# Patient Record
Sex: FEMALE | Race: WHITE | Employment: FULL TIME | URBAN - METROPOLITAN AREA
[De-identification: names, ages, dates, MRNs, and addresses within clinical notes are randomized per-mention and may not be internally consistent; named-entity substitution may affect disease eponyms.]

---

## 2017-10-19 ENCOUNTER — HOSPITAL ENCOUNTER (EMERGENCY)
Age: 57
Discharge: HOME | End: 2017-10-19
Attending: EMERGENCY MEDICINE
Payer: COMMERCIAL

## 2017-10-19 VITALS
OXYGEN SATURATION: 98 % | TEMPERATURE: 98.1 F | WEIGHT: 140 LBS | BODY MASS INDEX: 23.32 KG/M2 | RESPIRATION RATE: 20 BRPM | SYSTOLIC BLOOD PRESSURE: 146 MMHG | HEIGHT: 65 IN | HEART RATE: 72 BPM | DIASTOLIC BLOOD PRESSURE: 89 MMHG

## 2017-10-19 DIAGNOSIS — I10 ESSENTIAL HYPERTENSION: Primary | ICD-10-CM

## 2017-10-19 LAB
ANION GAP SERPL CALCULATED.3IONS-SCNC: 17 MEQ/L (ref 7–13)
BASOPHILS ABSOLUTE: 0 K/UL (ref 0–0.2)
BASOPHILS RELATIVE PERCENT: 0.6 %
BUN BLDV-MCNC: 11 MG/DL (ref 6–20)
CALCIUM SERPL-MCNC: 9.8 MG/DL (ref 8.6–10.2)
CHLORIDE BLD-SCNC: 101 MEQ/L (ref 98–107)
CO2: 25 MEQ/L (ref 22–29)
CREAT SERPL-MCNC: 0.46 MG/DL (ref 0.5–0.9)
EKG ATRIAL RATE: 63 BPM
EKG P AXIS: 72 DEGREES
EKG P-R INTERVAL: 112 MS
EKG Q-T INTERVAL: 416 MS
EKG QRS DURATION: 86 MS
EKG QTC CALCULATION (BAZETT): 425 MS
EKG R AXIS: 29 DEGREES
EKG T AXIS: 34 DEGREES
EKG VENTRICULAR RATE: 63 BPM
EOSINOPHILS ABSOLUTE: 0.2 K/UL (ref 0–0.7)
EOSINOPHILS RELATIVE PERCENT: 5.8 %
GFR AFRICAN AMERICAN: >60
GFR NON-AFRICAN AMERICAN: >60
GLUCOSE BLD-MCNC: 102 MG/DL (ref 74–109)
HCT VFR BLD CALC: 49.4 % (ref 37–47)
HEMOGLOBIN: 17.2 G/DL (ref 12–16)
LYMPHOCYTES ABSOLUTE: 1.4 K/UL (ref 1–4.8)
LYMPHOCYTES RELATIVE PERCENT: 36.6 %
MCH RBC QN AUTO: 33.5 PG (ref 27–31.3)
MCHC RBC AUTO-ENTMCNC: 34.9 % (ref 33–37)
MCV RBC AUTO: 95.9 FL (ref 82–100)
MONOCYTES ABSOLUTE: 0.3 K/UL (ref 0.2–0.8)
MONOCYTES RELATIVE PERCENT: 7.6 %
NEUTROPHILS ABSOLUTE: 1.8 K/UL (ref 1.4–6.5)
NEUTROPHILS RELATIVE PERCENT: 49.4 %
PDW BLD-RTO: 12.9 % (ref 11.5–14.5)
PLATELET # BLD: 320 K/UL (ref 130–400)
POTASSIUM SERPL-SCNC: 4.3 MEQ/L (ref 3.5–5.1)
RBC # BLD: 5.15 M/UL (ref 4.2–5.4)
SODIUM BLD-SCNC: 143 MEQ/L (ref 132–144)
TROPONIN: <0.01 NG/ML (ref 0–0.01)
WBC # BLD: 3.7 K/UL (ref 4.8–10.8)

## 2017-10-19 PROCEDURE — 6370000000 HC RX 637 (ALT 250 FOR IP): Performed by: EMERGENCY MEDICINE

## 2017-10-19 PROCEDURE — 93005 ELECTROCARDIOGRAM TRACING: CPT

## 2017-10-19 PROCEDURE — 84484 ASSAY OF TROPONIN QUANT: CPT

## 2017-10-19 PROCEDURE — 85025 COMPLETE CBC W/AUTO DIFF WBC: CPT

## 2017-10-19 PROCEDURE — 99284 EMERGENCY DEPT VISIT MOD MDM: CPT

## 2017-10-19 PROCEDURE — 80048 BASIC METABOLIC PNL TOTAL CA: CPT

## 2017-10-19 PROCEDURE — 36415 COLL VENOUS BLD VENIPUNCTURE: CPT

## 2017-10-19 RX ORDER — LISINOPRIL 20 MG/1
20 TABLET ORAL DAILY
Qty: 30 TABLET | Refills: 0 | Status: SHIPPED | OUTPATIENT
Start: 2017-10-19 | End: 2017-11-13 | Stop reason: SDUPTHER

## 2017-10-19 RX ORDER — SODIUM CHLORIDE 0.9 % (FLUSH) 0.9 %
3 SYRINGE (ML) INJECTION EVERY 8 HOURS
Status: DISCONTINUED | OUTPATIENT
Start: 2017-10-19 | End: 2017-10-19 | Stop reason: HOSPADM

## 2017-10-19 RX ORDER — CLONIDINE HYDROCHLORIDE 0.1 MG/1
0.2 TABLET ORAL ONCE
Status: COMPLETED | OUTPATIENT
Start: 2017-10-19 | End: 2017-10-19

## 2017-10-19 RX ADMIN — CLONIDINE HYDROCHLORIDE 0.2 MG: 0.1 TABLET ORAL at 08:24

## 2017-10-19 ASSESSMENT — ENCOUNTER SYMPTOMS
NAUSEA: 0
WHEEZING: 0
CHEST TIGHTNESS: 0
SHORTNESS OF BREATH: 0
BACK PAIN: 0
BLOOD IN STOOL: 0
SINUS PRESSURE: 0
GASTROINTESTINAL NEGATIVE: 1
COUGH: 0
RESPIRATORY NEGATIVE: 1
SINUS PAIN: 0
ABDOMINAL DISTENTION: 0
DIARRHEA: 0
EYES NEGATIVE: 1
ABDOMINAL PAIN: 0
VOMITING: 0
SORE THROAT: 0
RHINORRHEA: 0
EYE PAIN: 0
EYE DISCHARGE: 0

## 2017-10-19 ASSESSMENT — PAIN DESCRIPTION - PAIN TYPE: TYPE: ACUTE PAIN

## 2017-10-19 ASSESSMENT — PAIN DESCRIPTION - DESCRIPTORS: DESCRIPTORS: ACHING

## 2017-10-19 ASSESSMENT — PAIN SCALES - GENERAL: PAINLEVEL_OUTOF10: 2

## 2017-10-19 ASSESSMENT — PAIN DESCRIPTION - FREQUENCY: FREQUENCY: CONTINUOUS

## 2017-10-19 ASSESSMENT — PAIN DESCRIPTION - LOCATION: LOCATION: HEAD

## 2017-10-19 NOTE — ED PROVIDER NOTES
to 7 for level 4, 8 or more for level 5)     ED Triage Vitals [10/19/17 0801]   BP Temp Temp Source Pulse Resp SpO2 Height Weight   (!) 202/106 98.1 °F (36.7 °C) Oral 85 -- 97 % 5' 5\" (1.651 m) 140 lb (63.5 kg)       Physical Exam   Constitutional: She is oriented to person, place, and time. She appears well-developed and well-nourished. HENT:   Head: Normocephalic and atraumatic. Right Ear: External ear normal.   Eyes: Conjunctivae and EOM are normal. Pupils are equal, round, and reactive to light. Right eye exhibits no discharge. Left eye exhibits no discharge. No scleral icterus. Neck: Normal range of motion. Neck supple. No JVD present. No tracheal deviation present. Cardiovascular: Normal rate, regular rhythm, normal heart sounds and intact distal pulses. Exam reveals no friction rub. No murmur heard. Pulmonary/Chest: Effort normal and breath sounds normal. No stridor. No respiratory distress. She has no wheezes. She has no rales. She exhibits no tenderness. Abdominal: Soft. Bowel sounds are normal. She exhibits no distension. There is no tenderness. Musculoskeletal: Normal range of motion. She exhibits no edema, tenderness or deformity. Lymphadenopathy:     She has no cervical adenopathy. Neurological: She is alert and oriented to person, place, and time. Skin: Skin is warm and dry. No rash noted. No erythema. No pallor. Psychiatric: She has a normal mood and affect. Her behavior is normal. Judgment and thought content normal.   Nursing note and vitals reviewed. left external auditory canal slightly reddened but no tragus pain or external auricle pain with movement    DIAGNOSTIC RESULTS     EKG: All EKG's are interpreted by the Emergency Department Physician who either signs or Co-signs this chart in the absence of a cardiologist.    EKG interpreted by myself demonstrates a normal sinus rhythm with a ventricular rate is 63 MA interval of 112 and a QRS duration of 86.   The QT corrected is 425. This is a normal sinus rhythm and normal EKG. RADIOLOGY:   Non-plain film images such as CT, Ultrasound and MRI are read by the radiologist. Plain radiographic images are visualized and preliminarily interpreted by the emergency physician with the below findings:    None    Interpretation per the Radiologist below, if available at the time of this note:    No orders to display         ED BEDSIDE ULTRASOUND:   Performed by ED Physician - none    LABS:  Labs Reviewed   BASIC METABOLIC PANEL - Abnormal; Notable for the following:        Result Value    Anion Gap 17 (*)     CREATININE 0.46 (*)     All other components within normal limits   CBC WITH AUTO DIFFERENTIAL - Abnormal; Notable for the following:     WBC 3.7 (*)     Hemoglobin 17.2 (*)     Hematocrit 49.4 (*)     MCH 33.5 (*)     All other components within normal limits   TROPONIN       All other labs were within normal range or not returned as of this dictation. EMERGENCY DEPARTMENT COURSE and DIFFERENTIAL DIAGNOSIS/MDM:   Vitals:    Vitals:    10/19/17 0801 10/19/17 0854 10/19/17 0927   BP: (!) 202/106 (!) 203/116 (!) 146/89   Pulse: 85 72    Resp:   20   Temp: 98.1 °F (36.7 °C)     TempSrc: Oral     SpO2: 97% 97% 98%   Weight: 140 lb (63.5 kg)     Height: 5' 5\" (1.651 m)         Patient's EKG is normal the patient's laboratory did not show any renal insufficiency L be placing her on lisinopril. And she was given a follow-up with Dr. Dilcia Lozada who was her choice. My impression is acute essential hypertension untreated    MDM    CRITICAL CARE TIME   Total Critical Care time was 0 minutes, excluding separately reportable procedures. There was a high probability of clinically significant/life threatening deterioration in the patient's condition which required my urgent intervention. CONSULTS:  None    PROCEDURES:  Unless otherwise noted below, none     Procedures    FINAL IMPRESSION      1.  Essential hypertension

## 2017-10-23 ENCOUNTER — APPOINTMENT (OUTPATIENT)
Dept: GENERAL RADIOLOGY | Age: 57
End: 2017-10-23
Payer: COMMERCIAL

## 2017-10-23 ENCOUNTER — HOSPITAL ENCOUNTER (EMERGENCY)
Age: 57
Discharge: HOME | End: 2017-10-23
Attending: EMERGENCY MEDICINE
Payer: COMMERCIAL

## 2017-10-23 VITALS
DIASTOLIC BLOOD PRESSURE: 78 MMHG | SYSTOLIC BLOOD PRESSURE: 144 MMHG | TEMPERATURE: 98.4 F | HEIGHT: 65 IN | BODY MASS INDEX: 23.32 KG/M2 | RESPIRATION RATE: 18 BRPM | HEART RATE: 75 BPM | WEIGHT: 140 LBS | OXYGEN SATURATION: 99 %

## 2017-10-23 DIAGNOSIS — S16.1XXA ACUTE STRAIN OF NECK MUSCLE, INITIAL ENCOUNTER: ICD-10-CM

## 2017-10-23 DIAGNOSIS — M54.2 NECK PAIN: Primary | ICD-10-CM

## 2017-10-23 DIAGNOSIS — R51.9 NONINTRACTABLE HEADACHE, UNSPECIFIED CHRONICITY PATTERN, UNSPECIFIED HEADACHE TYPE: ICD-10-CM

## 2017-10-23 PROCEDURE — 99283 EMERGENCY DEPT VISIT LOW MDM: CPT

## 2017-10-23 PROCEDURE — 6370000000 HC RX 637 (ALT 250 FOR IP): Performed by: EMERGENCY MEDICINE

## 2017-10-23 PROCEDURE — 72040 X-RAY EXAM NECK SPINE 2-3 VW: CPT

## 2017-10-23 PROCEDURE — 96372 THER/PROPH/DIAG INJ SC/IM: CPT

## 2017-10-23 PROCEDURE — 6360000002 HC RX W HCPCS: Performed by: EMERGENCY MEDICINE

## 2017-10-23 RX ORDER — ORPHENADRINE CITRATE 30 MG/ML
60 INJECTION INTRAMUSCULAR; INTRAVENOUS ONCE
Status: DISCONTINUED | OUTPATIENT
Start: 2017-10-23 | End: 2017-10-23 | Stop reason: HOSPADM

## 2017-10-23 RX ORDER — KETOROLAC TROMETHAMINE 30 MG/ML
60 INJECTION, SOLUTION INTRAMUSCULAR; INTRAVENOUS ONCE
Status: COMPLETED | OUTPATIENT
Start: 2017-10-23 | End: 2017-10-23

## 2017-10-23 RX ORDER — CYCLOBENZAPRINE HCL 10 MG
10 TABLET ORAL ONCE
Status: COMPLETED | OUTPATIENT
Start: 2017-10-23 | End: 2017-10-23

## 2017-10-23 RX ORDER — KETOROLAC TROMETHAMINE 10 MG/1
10 TABLET, FILM COATED ORAL EVERY 6 HOURS PRN
Qty: 20 TABLET | Refills: 0 | Status: SHIPPED | OUTPATIENT
Start: 2017-10-23 | End: 2017-11-13 | Stop reason: ALTCHOICE

## 2017-10-23 RX ORDER — ORPHENADRINE CITRATE 100 MG/1
100 TABLET, EXTENDED RELEASE ORAL 2 TIMES DAILY
Qty: 20 TABLET | Refills: 0 | Status: SHIPPED | OUTPATIENT
Start: 2017-10-23 | End: 2017-11-02

## 2017-10-23 RX ADMIN — KETOROLAC TROMETHAMINE 60 MG: 30 INJECTION, SOLUTION INTRAMUSCULAR at 02:55

## 2017-10-23 RX ADMIN — CYCLOBENZAPRINE HYDROCHLORIDE 10 MG: 10 TABLET, FILM COATED ORAL at 03:05

## 2017-10-23 ASSESSMENT — ENCOUNTER SYMPTOMS
NAUSEA: 0
APNEA: 0
COUGH: 0
BACK PAIN: 0
CONSTIPATION: 0
RHINORRHEA: 0
ABDOMINAL PAIN: 0
SHORTNESS OF BREATH: 0
SORE THROAT: 0
ABDOMINAL DISTENTION: 0
VOMITING: 0
WHEEZING: 0
COLOR CHANGE: 0
PHOTOPHOBIA: 0
EYE PAIN: 0
DIARRHEA: 0
SINUS PRESSURE: 0

## 2017-10-23 ASSESSMENT — PAIN SCALES - GENERAL
PAINLEVEL_OUTOF10: 2
PAINLEVEL_OUTOF10: 0

## 2017-10-23 NOTE — ED PROVIDER NOTES
Head: Normocephalic and atraumatic. Nose: Nose normal.   Mouth/Throat: Oropharynx is clear and moist. No oropharyngeal exudate. Eyes: Conjunctivae and EOM are normal. Pupils are equal, round, and reactive to light. Right eye exhibits no discharge. Left eye exhibits no discharge. No scleral icterus. Neck: Normal range of motion. Neck supple. No JVD present. No tracheal deviation present. No thyromegaly present. Cardiovascular: Normal rate, regular rhythm, normal heart sounds and intact distal pulses. Exam reveals no gallop and no friction rub. No murmur heard. Pulmonary/Chest: Effort normal and breath sounds normal. No stridor. No respiratory distress. She has no wheezes. She has no rales. She exhibits no tenderness. Abdominal: Soft. Bowel sounds are normal. She exhibits no distension and no mass. There is no tenderness. There is no rebound and no guarding. Musculoskeletal: Normal range of motion. She exhibits no edema, tenderness or deformity. Lymphadenopathy:     She has no cervical adenopathy. Neurological: She is alert and oriented to person, place, and time. She has normal reflexes. No cranial nerve deficit. She exhibits normal muscle tone. Coordination normal.   Skin: Skin is warm and dry. No rash noted. She is not diaphoretic. No erythema. No pallor. Psychiatric: She has a normal mood and affect. Her behavior is normal. Judgment and thought content normal.   Nursing note and vitals reviewed.       DIAGNOSTIC RESULTS     EKG: All EKG's are interpreted by the Emergency Department Physician who either signs or Co-signs this chart in the absence of a cardiologist.        RADIOLOGY:   Non-plain film images such as CT, Ultrasound and MRI are read by the radiologist. Plain radiographic images are visualized and preliminarily interpreted by the emergency physician with the below findings:        Interpretation per the Radiologist below, if available at the time of this note:    XR CERVICAL SPINE (2-3 VIEWS)    (Results Pending)         ED BEDSIDE ULTRASOUND:   Performed by ED Physician - none    LABS:  Labs Reviewed - No data to display    All other labs were within normal range or not returned as of this dictation. EMERGENCY DEPARTMENT COURSE and DIFFERENTIAL DIAGNOSIS/MDM:   Vitals:    Vitals:    10/23/17 0213   BP: (!) 176/90   Pulse: 79   Resp: 18   Temp: 98.4 °F (36.9 °C)   SpO2: 99%   Weight: 140 lb (63.5 kg)   Height: 5' 5\" (1.651 m)           MDM  Number of Diagnoses or Management Options     Amount and/or Complexity of Data Reviewed  Tests in the radiology section of CPT®: reviewed and ordered    Risk of Complications, Morbidity, and/or Mortality  Presenting problems: moderate  Diagnostic procedures: moderate  Management options: moderate    Patient Progress  Patient progress: improved      CRITICAL CARE TIME   Total Critical Care time was jacy, excluding separately reportable procedures. There was a high probability of clinically significant/life threatening deterioration in the patient's condition which required my urgent intervention. CONSULTS:  None    PROCEDURES:  Unless otherwise noted below, none     Procedures    FINAL IMPRESSION      1. Neck pain    2. Acute strain of neck muscle, initial encounter    3.  Nonintractable headache, unspecified chronicity pattern, unspecified headache type          DISPOSITION/PLAN   DISPOSITION Decision to Discharge    PATIENT REFERRED TO:  [facility]  [address] [place] [address] [phone]          DISCHARGE MEDICATIONS:  New Prescriptions    KETOROLAC (TORADOL) 10 MG TABLET    Take 1 tablet by mouth every 6 hours as needed for Pain    ORPHENADRINE (NORFLEX) 100 MG EXTENDED RELEASE TABLET    Take 1 tablet by mouth 2 times daily for 10 days          (Please note that portions of this note were completed with a voice recognition program.  Efforts were made to edit the dictations but occasionally words are

## 2017-11-13 ENCOUNTER — OFFICE VISIT (OUTPATIENT)
Dept: FAMILY MEDICINE CLINIC | Age: 57
End: 2017-11-13

## 2017-11-13 VITALS
RESPIRATION RATE: 16 BRPM | SYSTOLIC BLOOD PRESSURE: 136 MMHG | HEART RATE: 76 BPM | WEIGHT: 139 LBS | TEMPERATURE: 97.4 F | HEIGHT: 65 IN | DIASTOLIC BLOOD PRESSURE: 84 MMHG | BODY MASS INDEX: 23.16 KG/M2

## 2017-11-13 DIAGNOSIS — Z76.89 ENCOUNTER TO ESTABLISH CARE: ICD-10-CM

## 2017-11-13 DIAGNOSIS — Z12.39 BREAST CANCER SCREENING: ICD-10-CM

## 2017-11-13 DIAGNOSIS — Z71.6 TOBACCO ABUSE COUNSELING: ICD-10-CM

## 2017-11-13 DIAGNOSIS — Z72.0 TOBACCO ABUSE: ICD-10-CM

## 2017-11-13 DIAGNOSIS — Z00.00 HEALTHCARE MAINTENANCE: ICD-10-CM

## 2017-11-13 DIAGNOSIS — I10 HYPERTENSION, UNSPECIFIED TYPE: Primary | ICD-10-CM

## 2017-11-13 LAB — HBA1C MFR BLD: 4.9 %

## 2017-11-13 PROCEDURE — 3017F COLORECTAL CA SCREEN DOC REV: CPT | Performed by: INTERNAL MEDICINE

## 2017-11-13 PROCEDURE — G8420 CALC BMI NORM PARAMETERS: HCPCS | Performed by: INTERNAL MEDICINE

## 2017-11-13 PROCEDURE — 83036 HEMOGLOBIN GLYCOSYLATED A1C: CPT | Performed by: INTERNAL MEDICINE

## 2017-11-13 PROCEDURE — G8427 DOCREV CUR MEDS BY ELIG CLIN: HCPCS | Performed by: INTERNAL MEDICINE

## 2017-11-13 PROCEDURE — 99204 OFFICE O/P NEW MOD 45 MIN: CPT | Performed by: INTERNAL MEDICINE

## 2017-11-13 PROCEDURE — 3014F SCREEN MAMMO DOC REV: CPT | Performed by: INTERNAL MEDICINE

## 2017-11-13 PROCEDURE — G8484 FLU IMMUNIZE NO ADMIN: HCPCS | Performed by: INTERNAL MEDICINE

## 2017-11-13 PROCEDURE — 4004F PT TOBACCO SCREEN RCVD TLK: CPT | Performed by: INTERNAL MEDICINE

## 2017-11-13 RX ORDER — LISINOPRIL 20 MG/1
20 TABLET ORAL DAILY
Qty: 30 TABLET | Refills: 0 | Status: SHIPPED | OUTPATIENT
Start: 2017-11-13

## 2017-11-13 ASSESSMENT — PATIENT HEALTH QUESTIONNAIRE - PHQ9
SUM OF ALL RESPONSES TO PHQ QUESTIONS 1-9: 0
SUM OF ALL RESPONSES TO PHQ9 QUESTIONS 1 & 2: 0
2. FEELING DOWN, DEPRESSED OR HOPELESS: 0
1. LITTLE INTEREST OR PLEASURE IN DOING THINGS: 0

## 2017-11-13 ASSESSMENT — ENCOUNTER SYMPTOMS
NAUSEA: 0
SHORTNESS OF BREATH: 0
EYE ITCHING: 0
PHOTOPHOBIA: 0
WHEEZING: 0
EYE REDNESS: 0
BACK PAIN: 0
CONSTIPATION: 0
EYE DISCHARGE: 0
EYE PAIN: 0
SORE THROAT: 0
VOICE CHANGE: 0
ABDOMINAL PAIN: 0
BLOOD IN STOOL: 0
TROUBLE SWALLOWING: 0
COLOR CHANGE: 0
COUGH: 0
ABDOMINAL DISTENTION: 0
DIARRHEA: 0

## 2017-11-13 NOTE — PROGRESS NOTES
Subjective:      Patient ID: Carole Sanchez is a 62 y.o. female    HPI    Presents to establish care with new PCP. Left old PCP for personal reasons. PMHx of headaches. Seen in [facility] ER on 10/19/17 and 10/23/17 for headaches and neck strain. Found with BP elevated 202/106  and 176/90 currently on lisinopril. Claims her BP readings at home are normally not elevated. Denies regular exercise, but is physically active at [facility] and home. Has been taking Advil for neck pain, drinks coffee daily,    2 galsses of wine 5 times per week. Consumes salt-cooked food, denies consumption of canned/processed foods. Never had a mammogram or colonoscopy in the past, last pap was 8 years ago-normal.     Complains of ringing in the ears x 3 weeks, no dizziness, no hearing difficulty. Worse in the left ear than right. Already seen by ENT and intends to continue to follow with them. Past Medical History:   Diagnosis Date    Headache     Hypertension      History reviewed. No pertinent surgical history. Social History     Social History    Marital status: Single     Spouse name: N/A    Number of children: N/A    Years of education: N/A     Occupational History    Not on file. Social History Main Topics    Smoking status: Current Every Day Smoker     Packs/day: 0.25     Years: 30.00     Types: Cigarettes    Smokeless tobacco: Never Used    Alcohol use 1.2 oz/week     2 Glasses of wine per week    Drug use: No    Sexual activity: Yes     Partners: Male     Other Topics Concern    Not on file     Social History Narrative    No narrative on file     Family History   Problem Relation Age of Onset    High Blood Pressure Maternal Aunt     High Blood Pressure Maternal Uncle     Diabetes Maternal Uncle      Allergies:  Review of patient's allergies indicates no known allergies. There is no problem list on file for this patient. No current outpatient prescriptions on file prior to visit.      No current facility-administered medications on file prior to visit. Review of Systems   Constitutional: Negative for activity change, appetite change, chills, diaphoresis, fatigue, fever and unexpected weight change. HENT: Negative for congestion, dental problem, drooling, ear discharge, ear pain, hearing loss, mouth sores, sore throat, trouble swallowing and voice change. Eyes: Negative for photophobia, pain, discharge, redness and itching. Respiratory: Negative for cough, shortness of breath and wheezing. Gastrointestinal: Negative for abdominal distention, abdominal pain, blood in stool, constipation, diarrhea and nausea. Endocrine: Negative for cold intolerance, heat intolerance, polydipsia and polyuria. Genitourinary: Negative for decreased urine volume, difficulty urinating, dysuria, flank pain, frequency, hematuria and urgency. Musculoskeletal: Negative for arthralgias, back pain and joint swelling. Skin: Negative for color change. Allergic/Immunologic: Negative for environmental allergies and food allergies. Neurological: Negative for dizziness, seizures, syncope, weakness, light-headedness, numbness and headaches. Psychiatric/Behavioral: Negative for agitation, decreased concentration, dysphoric mood and hallucinations. The patient is not nervous/anxious. Objective:   /84   Pulse 76   Temp 97.4 °F (36.3 °C) (Temporal)   Resp 16   Ht 5' 5\" (1.651 m)   Wt 139 lb (63 kg)   LMP  (LMP Unknown)   BMI 23.13 kg/m²     Physical Exam   Constitutional: She is oriented to person, place, and time. She appears well-developed and well-nourished. No distress. HENT:   Head: Normocephalic and atraumatic. Right Ear: External ear normal.   Left Ear: External ear normal.   Nose: Nose normal.   Mouth/Throat: Oropharynx is clear and moist. No oropharyngeal exudate. Eyes: Conjunctivae and EOM are normal. Pupils are equal, round, and reactive to light.  Right eye exhibits no discharge. Left eye exhibits no discharge. No scleral icterus. Neck: No JVD present. No thyromegaly present. Cardiovascular: Normal rate, regular rhythm, S1 normal, S2 normal, normal heart sounds and intact distal pulses. Exam reveals no gallop and no friction rub. No murmur heard. Pulmonary/Chest: Effort normal and breath sounds normal. No stridor. No respiratory distress. She has no wheezes. She has no rales. She exhibits no tenderness. Abdominal: Soft. Bowel sounds are normal. She exhibits no distension and no mass. There is no tenderness. There is no rebound. Musculoskeletal: Normal range of motion. She exhibits no edema, tenderness or deformity. Lymphadenopathy:     She has no cervical adenopathy. Neurological: She is alert and oriented to person, place, and time. She has normal reflexes. No cranial nerve deficit. She exhibits normal muscle tone. Coordination normal.   Skin: No rash noted. She is not diaphoretic. No erythema. Psychiatric: She has a normal mood and affect. Her behavior is normal.   Vitals reviewed. Assessment:      1. Hypertension, unspecified type  lisinopril (PRINIVIL) 20 MG tablet   2. Breast cancer screening  MILIND DIGITAL SCREEN W CAD BILATERAL   3. Healthcare maintenance  MILIND DIGITAL SCREEN W CAD BILATERAL    Lipid Panel    CBC Auto Differential    Comprehensive Metabolic Panel    TSH Without Reflex    Ambulatory referral to Gastroenterology    POCT glycosylated hemoglobin (Hb A1C)   4. Tobacco abuse     5. Tobacco abuse counseling     6.  Encounter to establish care  MILIND DIGITAL SCREEN W CAD BILATERAL    Lipid Panel    CBC Auto Differential    Comprehensive Metabolic Panel    TSH Without Reflex    POCT glycosylated hemoglobin (Hb A1C)         Plan:      Orders Placed This Encounter   Procedures    MILIND DIGITAL SCREEN W CAD BILATERAL     Standing Status:   Future     Standing Expiration Date:   11/13/2018     Order Specific Question:   Reason for exam:     Answer:

## 2017-11-13 NOTE — PATIENT INSTRUCTIONS
Tips to Help You Stop Smoking       Cigarette smoking is a preventable cause of death in the [place]. If you have thought about quitting but haven't been able to, here are some reasons why you should and some ways to do it. Here's Why   Quitting smoking now can decrease your risk of getting smoking-related illnesses like:   Heart disease   Stroke   Several types of cancer, including:   Lung   Mouth   Esophagus   Larynx   Bladder   Pancreas   Kidney   Chronic lung diseases:   Bronchitis   Emphysema   Asthma   Cataracts   Macular degeneration   Thyroid conditions   Hearing loss   Erectile dysfunction   Dementia   Osteoporosis   Here's How   Once you've decided to quit smoking, set your target quit date a few weeks away. In the time leading up to your quit day, try some of these ideas offered by the [address] to help you successfully quit smoking. For the best results, work with your doctor. Together, you can test your lung function and compare the results to those of a nonsmoking person. The results can be given to you as your lung age. Finding out your lung age right after having the test done may help you to stop smoking. Your doctor can also discuss with you all of your options and refer you to smoking-cessation support groups. You may wish to use nicotine replacement (gum, patches, inhaler) or one of the prescription medications that have been shown to increase quit rates and prolong abstinence from smoking. But whatever you and your doctor decide on these matters, it will still be you who decides when an how to quit. Here are some techniques:   Switch Brands   Switch to a brand you find distasteful. Change to a brand that is low in tar and nicotine a couple of weeks before your target quit date. This will help change your smoking behavior.  However, do not smoke more cigarettes, inhale them more often or more deeply, or place your fingertips over for you. If you like to smoke with others, smoke alone. Turn your chair to an empty corner and focus only on the cigarette you are smoking and all its many negative effects. Collect all your cigarette butts in one large glass container as a visual reminder of the filth made by smoking. Just Before Quitting   Practice going without cigarettes. Don't think of never smoking again. Think of quitting in terms of one day at a time . Tell yourself you won't smoke today, and then don't. Clean your clothes to rid them of the cigarette smell, which can linger a long time. On the Day You Quit   Throw away all your cigarettes and matches. Hide your lighters and ashtrays. Visit the dentist and have your teeth cleaned to get rid of tobacco stains. Notice how nice they look and resolve to keep them that way. Make a list of things you'd like to buy for yourself or someone else. Estimate the cost in terms of packs of cigarettes, and put the money aside to buy these presents. Keep very busy on the big day. Go to the movies, exercise, take long walks, or go bike riding. Remind your family and friends that this is your quit date, and ask them to help you over the rough spots of the first couple of days and weeks. Buy yourself a treat or do something special to celebrate. Telephone and Internet Support   Telephone, web-, and computer-based programs can offer you the support that you need to quit and to stay smoke-free. You can find many programs online, like the American [employer] Association's [place] from Smoking . Immediately After Quitting   Develop a clean, fresh, nonsmoking environment around yourselfat work and at home. Buy yourself flowersyou may be surprised how much you can enjoy their scent now. The first few days after you quit, spend as much free time as possible in places where smoking isn't allowed, such as [address] [place] [address] Street, museums, theaters, department stores, and churches.    Drink large quantities of water and fruit juice (but avoid sodas that contain caffeine). Try to avoid alcohol, coffee, and other beverages that you associate with cigarette smoking. Strike up conversation instead of a match for a cigarette. If you miss the sensation of having a cigarette in your hand, play with something elsea pencil, a paper clip, a marble. If you miss having something in your mouth, try toothpicks or a fake cigarette.

## 2022-05-10 ENCOUNTER — APPOINTMENT (OUTPATIENT)
Dept: GENERAL RADIOLOGY | Age: 62
End: 2022-05-10
Payer: COMMERCIAL

## 2022-05-10 ENCOUNTER — HOSPITAL ENCOUNTER (EMERGENCY)
Age: 62
Discharge: HOME | End: 2022-05-10
Attending: EMERGENCY MEDICINE
Payer: COMMERCIAL

## 2022-05-10 VITALS
DIASTOLIC BLOOD PRESSURE: 84 MMHG | WEIGHT: 140 LBS | OXYGEN SATURATION: 97 % | TEMPERATURE: 96.9 F | SYSTOLIC BLOOD PRESSURE: 152 MMHG | BODY MASS INDEX: 23.32 KG/M2 | HEART RATE: 72 BPM | RESPIRATION RATE: 16 BRPM | HEIGHT: 65 IN

## 2022-05-10 DIAGNOSIS — S80.812A ABRASION OF LEFT LOWER EXTREMITY, INITIAL ENCOUNTER: Primary | ICD-10-CM

## 2022-05-10 DIAGNOSIS — S80.811A ABRASION OF RIGHT LOWER EXTREMITY, INITIAL ENCOUNTER: ICD-10-CM

## 2022-05-10 PROCEDURE — 90471 IMMUNIZATION ADMIN: CPT | Performed by: EMERGENCY MEDICINE

## 2022-05-10 PROCEDURE — 90715 TDAP VACCINE 7 YRS/> IM: CPT | Performed by: EMERGENCY MEDICINE

## 2022-05-10 PROCEDURE — 6360000002 HC RX W HCPCS: Performed by: EMERGENCY MEDICINE

## 2022-05-10 PROCEDURE — 73562 X-RAY EXAM OF KNEE 3: CPT

## 2022-05-10 PROCEDURE — 99284 EMERGENCY DEPT VISIT MOD MDM: CPT

## 2022-05-10 PROCEDURE — 73590 X-RAY EXAM OF LOWER LEG: CPT

## 2022-05-10 RX ADMIN — TETANUS TOXOID, REDUCED DIPHTHERIA TOXOID AND ACELLULAR PERTUSSIS VACCINE, ADSORBED 0.5 ML: 5; 2.5; 8; 8; 2.5 SUSPENSION INTRAMUSCULAR at 11:42

## 2022-05-10 ASSESSMENT — PAIN DESCRIPTION - LOCATION: LOCATION: LEG

## 2022-05-10 ASSESSMENT — PAIN DESCRIPTION - FREQUENCY: FREQUENCY: CONTINUOUS

## 2022-05-10 ASSESSMENT — PAIN SCALES - GENERAL: PAINLEVEL_OUTOF10: 4

## 2022-05-10 ASSESSMENT — PAIN DESCRIPTION - DESCRIPTORS: DESCRIPTORS: ACHING

## 2022-05-10 ASSESSMENT — PAIN DESCRIPTION - ORIENTATION: ORIENTATION: LOWER;RIGHT

## 2022-05-10 ASSESSMENT — PAIN DESCRIPTION - PAIN TYPE: TYPE: ACUTE PAIN

## 2022-05-10 NOTE — ED PROVIDER NOTES
[facility] ED  EMERGENCY DEPARTMENT ENCOUNTER      Pt Name: Roberto Montemayor  MRN: [id number]  [place] 1960  Date of evaluation: 5/10/2022  Provider: Ayala Pacheco MD    CHIEF COMPLAINT       Chief Complaint   Patient presents with    Leg Injury     Pt has abrassions to both legs, pt fell through a cement slab covering a septic system         HISTORY OF PRESENT ILLNESS   (Location/Symptom, Timing/Onset, Context/Setting, Quality, Duration, Modifying Factors, Severity)  Note limiting factors. 66-year-old female presenting with right leg pain. Patient tripped and fell onto a septic tank onto her right leg. She did not hit her head or lose consciousness. She is not on blood thinners. Has not taken anything for relief prior to arrival.  She is able to walk. Nursing Notes were reviewed. REVIEW OF SYSTEMS    (2-9 systems for level 4, 10 or more for level 5)     Review of Systems   All other systems reviewed and are negative. Except as noted above the remainder of the review of systems was reviewed and negative. PAST MEDICAL HISTORY     Past Medical History:   Diagnosis Date    Headache     Hypertension          SURGICAL HISTORY     History reviewed. No pertinent surgical history. CURRENT MEDICATIONS       Current Discharge Medication List      CONTINUE these medications which have NOT CHANGED    Details   lisinopril (PRINIVIL) 20 MG tablet Take 1 tablet by mouth daily  Qty: 30 tablet, Refills: 0    Associated Diagnoses: Hypertension, unspecified type             ALLERGIES     Patient has no known allergies.     FAMILY HISTORY       Family History   Problem Relation Age of Onset    High Blood Pressure Maternal Aunt     High Blood Pressure Maternal Uncle     Diabetes Maternal Uncle           SOCIAL HISTORY       Social History     Socioeconomic History    Marital status: Single     Spouse name: None    Number of children: None    Years of education: None    Highest education level: None   Occupational History    None   Tobacco Use    Smoking status: Current Every Day Smoker     Packs/day: 0.25     Years: 30.00     Pack years: 7.50     Types: Cigarettes    Smokeless tobacco: Never Used   Vaping Use    Vaping Use: Never used   Substance and Sexual Activity    Alcohol use: Yes     Alcohol/week: 2.0 standard drinks     Types: 2 Glasses of wine per week    Drug use: No    Sexual activity: Yes     Partners: Male   Other Topics Concern    None   Social History Narrative    None     Social Determinants of Health     Financial Resource Strain:     Difficulty of Paying Living Expenses: Not on file   Food Insecurity:     Worried About Running Out of Food in the Last Year: Not on file    Steven of Food in the Last Year: Not on file   Transportation Needs:     Lack of Transportation (Medical): Not on file    Lack of Transportation (Non-Medical):  Not on file   Physical Activity:     Days of Exercise per Week: Not on file    Minutes of Exercise per Session: Not on file   Stress:     Feeling of Stress : Not on file   Social Connections:     Frequency of Communication with Friends and Family: Not on file    Frequency of Social Gatherings with Friends and Family: Not on file    Attends Baptism Services: Not on file    Active Member of [address] [place] [address] [employer] or Organizations: Not on file    Attends Club or Organization Meetings: Not on file    Marital Status: Not on file   Intimate Partner Violence:     Fear of Current or Ex-Partner: Not on file    Emotionally Abused: Not on file    Physically Abused: Not on file    Sexually Abused: Not on file   Housing Stability:     Unable to Pay for Housing in the Last Year: Not on file    Number of [place] in the Last Year: Not on file    Unstable Housing in the Last Year: Not on file       SCREENINGS               PHYSICAL EXAM    (up to 7 for level 4, 8 or more for level 5)     ED Triage Vitals [05/10/22 1113]   BP Temp Temp Source Pulse Resp SpO2 Height Weight   (!) 164/102 96.9 °F (36.1 °C) Temporal 90 20 97 % 5' 5\" (1.651 m) 140 lb (63.5 kg)       Physical Exam  Vitals and nursing note reviewed. Constitutional:       General: She is not in acute distress. Appearance: Normal appearance. She is well-developed. She is not ill-appearing. HENT:      Head: Normocephalic and atraumatic. Mouth/Throat:      Mouth: Mucous membranes are moist.      Pharynx: Oropharynx is clear. Eyes:      Extraocular Movements: Extraocular movements intact. Conjunctiva/sclera: Conjunctivae normal.   Cardiovascular:      Rate and Rhythm: Normal rate and regular rhythm. Pulmonary:      Effort: Pulmonary effort is normal.      Breath sounds: Normal breath sounds. Abdominal:      General: Bowel sounds are normal.      Palpations: Abdomen is soft. Tenderness: There is no abdominal tenderness. Musculoskeletal:         General: No deformity. Normal range of motion. Cervical back: Normal range of motion and neck supple. Comments: Abrasions of both legs   Skin:     General: Skin is warm and dry. Capillary Refill: Capillary refill takes less than 2 seconds. Neurological:      General: No focal deficit present. Mental Status: She is alert and oriented to person, place, and time. Mental status is at baseline. Cranial Nerves: No cranial nerve deficit. Psychiatric:         Thought Content:  Thought content normal.         DIAGNOSTIC RESULTS     EKG: All EKG's are interpreted by the Emergency Department Physician who either signs or Co-signs this chart in the absence of a cardiologist.    RADIOLOGY:   Non-plain film images such as CT, Ultrasound and MRI are read by the radiologist. Plain radiographic images are visualized and preliminarily interpreted by the emergency physician with the below findings:    R knee- neg acute  R tib fib- neg acute    Interpretation per the Radiologist below, if available at the time of this note:    XR KNEE RIGHT (3 VIEWS)    (Results Pending)   XR TIBIA FIBULA RIGHT (2 VIEWS)    (Results Pending)       LABS:  Labs Reviewed - No data to display    All other labs were within normal range or not returned as of this dictation. EMERGENCY DEPARTMENT COURSE and DIFFERENTIAL DIAGNOSIS/MDM:   Vitals:    Vitals:    05/10/22 1113   BP: (!) 164/102   Pulse: 90   Resp: 20   Temp: 96.9 °F (36.1 °C)   TempSrc: Temporal   SpO2: 97%   Weight: 140 lb (63.5 kg)   Height: 5' 5\" (1.651 m)       MDM  Number of Diagnoses or Management Options  Abrasion of left lower extremity, initial encounter  Abrasion of right lower extremity, initial encounter  Diagnosis management comments: Wound care recommended. Patient will be discharged home in good condition. Patient has been hemodynamically stable throughout ED course and is appropriate for outpatient follow up. Patient should follow up with PCP in 2-3 days or return to ED immediately for any new or worsening symptoms. Patient is well appearing on discharge and agreeable with plan of care. Procedures    CRITICAL CARE TIME   Total Critical Care time was 0 minutes, excluding separately reportable procedures. There was a high probability of clinically significant/life threatening deterioration in the patient's condition which required my urgent intervention. FINAL IMPRESSION      1. Abrasion of left lower extremity, initial encounter    2.  Abrasion of right lower extremity, initial encounter          DISPOSITION/PLAN   DISPOSITION Decision To Discharge 05/10/2022 11:40:57 AM      (Please note that portions of this note were completed with a voice recognition program.  Efforts were made to edit the dictations but occasionally words are mis-transcribed.)    Reuben Dacosta MD (electronically signed)  Attending Emergency Physician        Reuben Dacosta MD  05/10/22 5662

## 2022-05-10 NOTE — ED TRIAGE NOTES
Pt presents with right knee injury secondary to trip fall, Pt denies LOC, denies head/neck/back pain. ABCs and MSPs x4 intact.

## 2024-12-25 ENCOUNTER — HOSPITAL ENCOUNTER (EMERGENCY)
Age: 64
Discharge: HOME | End: 2024-12-25
Payer: COMMERCIAL

## 2024-12-25 ENCOUNTER — APPOINTMENT (OUTPATIENT)
Dept: CT IMAGING | Age: 64
End: 2024-12-25
Payer: COMMERCIAL

## 2024-12-25 VITALS
HEART RATE: 66 BPM | HEIGHT: 65 IN | TEMPERATURE: 98.1 F | DIASTOLIC BLOOD PRESSURE: 69 MMHG | WEIGHT: 140 LBS | OXYGEN SATURATION: 95 % | BODY MASS INDEX: 23.32 KG/M2 | RESPIRATION RATE: 20 BRPM | SYSTOLIC BLOOD PRESSURE: 121 MMHG

## 2024-12-25 DIAGNOSIS — S09.90XA INJURY OF HEAD, INITIAL ENCOUNTER: Primary | ICD-10-CM

## 2024-12-25 PROCEDURE — 72125 CT NECK SPINE W/O DYE: CPT

## 2024-12-25 PROCEDURE — 99284 EMERGENCY DEPT VISIT MOD MDM: CPT

## 2024-12-25 PROCEDURE — 70450 CT HEAD/BRAIN W/O DYE: CPT

## 2024-12-25 PROCEDURE — 6370000000 HC RX 637 (ALT 250 FOR IP)

## 2024-12-25 RX ORDER — ACETAMINOPHEN 325 MG/1
650 TABLET ORAL ONCE
Status: COMPLETED | OUTPATIENT
Start: 2024-12-25 | End: 2024-12-25

## 2024-12-25 RX ADMIN — ACETAMINOPHEN 650 MG: 325 TABLET ORAL at 15:10

## 2024-12-25 ASSESSMENT — PAIN - FUNCTIONAL ASSESSMENT: PAIN_FUNCTIONAL_ASSESSMENT: 0-10

## 2024-12-25 ASSESSMENT — PAIN DESCRIPTION - ORIENTATION: ORIENTATION: POSTERIOR

## 2024-12-25 ASSESSMENT — ENCOUNTER SYMPTOMS
NAUSEA: 0
PHOTOPHOBIA: 0
RESPIRATORY NEGATIVE: 1
VOMITING: 0
ALLERGIC/IMMUNOLOGIC NEGATIVE: 1

## 2024-12-25 ASSESSMENT — VISUAL ACUITY: OU: 1

## 2024-12-25 ASSESSMENT — PAIN DESCRIPTION - DESCRIPTORS: DESCRIPTORS: ACHING

## 2024-12-25 ASSESSMENT — LIFESTYLE VARIABLES
HOW OFTEN DO YOU HAVE A DRINK CONTAINING ALCOHOL: 2-3 TIMES A WEEK
HOW MANY STANDARD DRINKS CONTAINING ALCOHOL DO YOU HAVE ON A TYPICAL DAY: 1 OR 2

## 2024-12-25 ASSESSMENT — PAIN DESCRIPTION - LOCATION: LOCATION: HEAD

## 2024-12-25 ASSESSMENT — PAIN DESCRIPTION - FREQUENCY: FREQUENCY: CONTINUOUS

## 2024-12-25 ASSESSMENT — PAIN DESCRIPTION - ONSET: ONSET: SUDDEN

## 2024-12-25 ASSESSMENT — PAIN SCALES - GENERAL: PAINLEVEL_OUTOF10: 8

## 2024-12-25 ASSESSMENT — PAIN DESCRIPTION - PAIN TYPE: TYPE: ACUTE PAIN

## 2024-12-25 NOTE — ED TRIAGE NOTES
Patient states she fell and hit head, pt states \" I think I lost consciousness for a second.\" Patient states she tripped over boxes and hot the back of head. Patient arrives a/o x3, appropriate to age.

## 2024-12-25 NOTE — ED NOTES
Patient able to ambulate in room and in hallway to bathroom and back to bed with sure and steady gait, no difficulties noted. Patient remains a/o x3, appropriate to age.

## 2024-12-25 NOTE — ED PROVIDER NOTES
[facility] ED  EMERGENCY DEPARTMENT ENCOUNTER      Pt Name: Cate Anderson  MRN: [id number]  Birthdate 1960  Date of evaluation: 12/25/2024  Provider: NICKO Weiner CNP      HISTORY OF PRESENT ILLNESS    Cate Anderson is a 64 y.o. female who presents to the Emergency Department with patient reporting loss of consciousness with head injury occurring yesterday evening.  She was only out for a few seconds per her daughter who witnessed the injury.  She denies any dizziness prior to the fall she did tripped over some boxes hit her head on a table.  Today she is experiencing fatigue and headache pain.  Patient reports she did not sleep last night due stress or fights at [employer].  She is experiencing fatigue and headache today uncertain if it is due to stress.        REVIEW OF SYSTEMS       Review of Systems   Constitutional:  Positive for fatigue. Negative for activity change, appetite change and fever.   HENT: Negative.     Eyes:  Negative for photophobia and visual disturbance.   Respiratory: Negative.     Cardiovascular: Negative.    Gastrointestinal:  Negative for nausea and vomiting.   Genitourinary:  Negative for dyspareunia and dysuria.   Musculoskeletal: Negative.  Negative for neck pain and neck stiffness.   Skin: Negative.    Allergic/Immunologic: Negative.    Neurological:  Positive for syncope, light-headedness and headaches. Negative for dizziness, seizures, facial asymmetry and weakness.   Psychiatric/Behavioral:  Positive for sleep disturbance. Negative for agitation, confusion and suicidal ideas. The patient is nervous/anxious.          PAST MEDICAL HISTORY     Past Medical History:   Diagnosis Date    Headache     Hypertension          SURGICAL HISTORY     No past surgical history on file.      CURRENT MEDICATIONS       Previous Medications    LISINOPRIL (PRINIVIL) 20 MG TABLET    Take 1 tablet by mouth daily       ALLERGIES     Patient has no known allergies.    FAMILY HISTORY

## 2024-12-25 NOTE — DISCHARGE INSTRUCTIONS
If you experience any vomiting, confusion, visual changes or sudden onset of severe headache return to the ER immediately.

## 2025-01-06 ENCOUNTER — APPOINTMENT (OUTPATIENT)
Dept: RADIOLOGY | Facility: HOSPITAL | Age: 65
End: 2025-01-06
Payer: COMMERCIAL

## 2025-01-06 ENCOUNTER — APPOINTMENT (OUTPATIENT)
Dept: CARDIOLOGY | Facility: HOSPITAL | Age: 65
End: 2025-01-06
Payer: COMMERCIAL

## 2025-01-06 ENCOUNTER — APPOINTMENT (OUTPATIENT)
Dept: PRIMARY CARE | Facility: CLINIC | Age: 65
End: 2025-01-06
Payer: COMMERCIAL

## 2025-01-06 ENCOUNTER — TELEPHONE (OUTPATIENT)
Dept: PRIMARY CARE | Facility: CLINIC | Age: 65
End: 2025-01-06

## 2025-01-06 ENCOUNTER — HOSPITAL ENCOUNTER (INPATIENT)
Facility: HOSPITAL | Age: 65
LOS: 3 days | Discharge: HOME | End: 2025-01-10
Attending: STUDENT IN AN ORGANIZED HEALTH CARE EDUCATION/TRAINING PROGRAM | Admitting: EMERGENCY MEDICINE
Payer: COMMERCIAL

## 2025-01-06 DIAGNOSIS — F10.929 ALCOHOLIC INTOXICATION WITH COMPLICATION (CMS-HCC): ICD-10-CM

## 2025-01-06 DIAGNOSIS — K21.9 GASTROESOPHAGEAL REFLUX DISEASE WITHOUT ESOPHAGITIS: ICD-10-CM

## 2025-01-06 DIAGNOSIS — R90.89 ABNORMAL CT OF BRAIN: ICD-10-CM

## 2025-01-06 DIAGNOSIS — Z95.0 PACEMAKER: ICD-10-CM

## 2025-01-06 DIAGNOSIS — R41.82 ALTERED MENTAL STATUS, UNSPECIFIED ALTERED MENTAL STATUS TYPE: Primary | ICD-10-CM

## 2025-01-06 DIAGNOSIS — F10.10 ETOH ABUSE: ICD-10-CM

## 2025-01-06 PROBLEM — S60.229A HAND CONTUSION: Status: ACTIVE | Noted: 2025-01-06

## 2025-01-06 PROBLEM — I10 HYPERTENSION: Status: ACTIVE | Noted: 2025-01-06

## 2025-01-06 PROBLEM — R42 LIGHTHEADEDNESS: Status: ACTIVE | Noted: 2025-01-06

## 2025-01-06 PROBLEM — R29.6 RECURRENT FALLS: Status: ACTIVE | Noted: 2025-01-06

## 2025-01-06 PROBLEM — F90.9 HYPERACTIVITY: Status: ACTIVE | Noted: 2025-01-06

## 2025-01-06 PROBLEM — F41.9 ANXIETY: Status: ACTIVE | Noted: 2025-01-06

## 2025-01-06 PROBLEM — M77.8 WRIST TENDONITIS: Status: ACTIVE | Noted: 2025-01-06

## 2025-01-06 PROBLEM — M79.646 FINGER PAIN: Status: ACTIVE | Noted: 2025-01-06

## 2025-01-06 LAB
ALBUMIN SERPL BCP-MCNC: 3.8 G/DL (ref 3.4–5)
ALP SERPL-CCNC: 111 U/L (ref 33–136)
ALT SERPL W P-5'-P-CCNC: 106 U/L (ref 7–45)
AMMONIA PLAS-SCNC: 37 UMOL/L (ref 16–53)
AMPHETAMINES UR QL SCN: NORMAL
ANION GAP BLDV CALCULATED.4IONS-SCNC: 13 MMOL/L (ref 10–25)
ANION GAP SERPL CALC-SCNC: 15 MMOL/L (ref 10–20)
APAP SERPL-MCNC: <10 UG/ML
APPEARANCE UR: CLEAR
AST SERPL W P-5'-P-CCNC: 182 U/L (ref 9–39)
ATRIAL RATE: 87 BPM
BARBITURATES UR QL SCN: NORMAL
BASE EXCESS BLDV CALC-SCNC: 2.3 MMOL/L (ref -2–3)
BASOPHILS # BLD AUTO: 0.06 X10*3/UL (ref 0–0.1)
BASOPHILS NFR BLD AUTO: 2 %
BENZODIAZ UR QL SCN: NORMAL
BILIRUB SERPL-MCNC: 0.6 MG/DL (ref 0–1.2)
BILIRUB UR STRIP.AUTO-MCNC: NEGATIVE MG/DL
BNP SERPL-MCNC: 85 PG/ML (ref 0–99)
BODY TEMPERATURE: ABNORMAL
BUN SERPL-MCNC: 9 MG/DL (ref 6–23)
BZE UR QL SCN: NORMAL
CA-I BLDV-SCNC: 0.95 MMOL/L (ref 1.1–1.33)
CALCIUM SERPL-MCNC: 8.6 MG/DL (ref 8.6–10.3)
CANNABINOIDS UR QL SCN: NORMAL
CARDIAC TROPONIN I PNL SERPL HS: 7 NG/L (ref 0–13)
CARDIAC TROPONIN I PNL SERPL HS: 7 NG/L (ref 0–13)
CHLORIDE BLDV-SCNC: 104 MMOL/L (ref 98–107)
CHLORIDE SERPL-SCNC: 104 MMOL/L (ref 98–107)
CO2 SERPL-SCNC: 26 MMOL/L (ref 21–32)
COLOR UR: NORMAL
CREAT SERPL-MCNC: 0.53 MG/DL (ref 0.5–1.05)
EGFRCR SERPLBLD CKD-EPI 2021: >90 ML/MIN/1.73M*2
EOSINOPHIL # BLD AUTO: 0.1 X10*3/UL (ref 0–0.7)
EOSINOPHIL NFR BLD AUTO: 3.4 %
ERYTHROCYTE [DISTWIDTH] IN BLOOD BY AUTOMATED COUNT: 12.7 % (ref 11.5–14.5)
ETHANOL SERPL-MCNC: 339 MG/DL
FENTANYL+NORFENTANYL UR QL SCN: NORMAL
FLUAV RNA RESP QL NAA+PROBE: NOT DETECTED
FLUBV RNA RESP QL NAA+PROBE: NOT DETECTED
GLUCOSE BLDV-MCNC: 106 MG/DL (ref 74–99)
GLUCOSE SERPL-MCNC: 105 MG/DL (ref 74–99)
GLUCOSE UR STRIP.AUTO-MCNC: NORMAL MG/DL
HCO3 BLDV-SCNC: 26.5 MMOL/L (ref 22–26)
HCT VFR BLD AUTO: 38.5 % (ref 36–46)
HCT VFR BLD EST: 49 % (ref 36–46)
HGB BLD-MCNC: 13.4 G/DL (ref 12–16)
HGB BLDV-MCNC: 16.3 G/DL (ref 12–16)
IMM GRANULOCYTES # BLD AUTO: 0.02 X10*3/UL (ref 0–0.7)
IMM GRANULOCYTES NFR BLD AUTO: 0.7 % (ref 0–0.9)
INHALED O2 CONCENTRATION: 21 %
INR PPP: 1 (ref 0.9–1.1)
KETONES UR STRIP.AUTO-MCNC: NEGATIVE MG/DL
LACTATE BLDV-SCNC: 2.6 MMOL/L (ref 0.4–2)
LACTATE BLDV-SCNC: 3 MMOL/L (ref 0.4–2)
LACTATE SERPL-SCNC: 1.9 MMOL/L (ref 0.4–2)
LACTATE SERPL-SCNC: 2.7 MMOL/L (ref 0.4–2)
LEUKOCYTE ESTERASE UR QL STRIP.AUTO: NEGATIVE
LYMPHOCYTES # BLD AUTO: 1.01 X10*3/UL (ref 1.2–4.8)
LYMPHOCYTES NFR BLD AUTO: 34.1 %
MAGNESIUM SERPL-MCNC: 1.72 MG/DL (ref 1.6–2.4)
MCH RBC QN AUTO: 33.9 PG (ref 26–34)
MCHC RBC AUTO-ENTMCNC: 34.8 G/DL (ref 32–36)
MCV RBC AUTO: 98 FL (ref 80–100)
METHADONE UR QL SCN: NORMAL
MONOCYTES # BLD AUTO: 0.36 X10*3/UL (ref 0.1–1)
MONOCYTES NFR BLD AUTO: 12.2 %
NEUTROPHILS # BLD AUTO: 1.41 X10*3/UL (ref 1.2–7.7)
NEUTROPHILS NFR BLD AUTO: 47.6 %
NITRITE UR QL STRIP.AUTO: NEGATIVE
NRBC BLD-RTO: 0 /100 WBCS (ref 0–0)
OPIATES UR QL SCN: NORMAL
OXYCODONE+OXYMORPHONE UR QL SCN: NORMAL
OXYHGB MFR BLDV: 70 % (ref 45–75)
P AXIS: 50 DEGREES
P OFFSET: 211 MS
P ONSET: 164 MS
PCO2 BLDV: 39 MM HG (ref 41–51)
PCP UR QL SCN: NORMAL
PH BLDV: 7.44 PH (ref 7.33–7.43)
PH UR STRIP.AUTO: 6.5 [PH]
PLATELET # BLD AUTO: 183 X10*3/UL (ref 150–450)
PO2 BLDV: 45 MM HG (ref 35–45)
POTASSIUM BLDV-SCNC: 4.4 MMOL/L (ref 3.5–5.3)
POTASSIUM SERPL-SCNC: 4 MMOL/L (ref 3.5–5.3)
PR INTERVAL: 120 MS
PROT SERPL-MCNC: 7.1 G/DL (ref 6.4–8.2)
PROT UR STRIP.AUTO-MCNC: NEGATIVE MG/DL
PROTHROMBIN TIME: 11.1 SECONDS (ref 9.8–12.8)
Q ONSET: 224 MS
QRS COUNT: 15 BEATS
QRS DURATION: 70 MS
QT INTERVAL: 380 MS
QTC CALCULATION(BAZETT): 457 MS
QTC FREDERICIA: 430 MS
R AXIS: 39 DEGREES
RBC # BLD AUTO: 3.95 X10*6/UL (ref 4–5.2)
RBC # UR STRIP.AUTO: NEGATIVE /UL
RSV RNA RESP QL NAA+PROBE: NOT DETECTED
SALICYLATES SERPL-MCNC: <3 MG/DL
SAO2 % BLDV: 72 % (ref 45–75)
SARS-COV-2 RNA RESP QL NAA+PROBE: NOT DETECTED
SODIUM BLDV-SCNC: 139 MMOL/L (ref 136–145)
SODIUM SERPL-SCNC: 141 MMOL/L (ref 136–145)
SP GR UR STRIP.AUTO: 1.01
T AXIS: -3 DEGREES
T OFFSET: 414 MS
UROBILINOGEN UR STRIP.AUTO-MCNC: NORMAL MG/DL
VENTRICULAR RATE: 87 BPM
WBC # BLD AUTO: 3 X10*3/UL (ref 4.4–11.3)

## 2025-01-06 PROCEDURE — 83605 ASSAY OF LACTIC ACID: CPT | Performed by: PHYSICIAN ASSISTANT

## 2025-01-06 PROCEDURE — 2500000004 HC RX 250 GENERAL PHARMACY W/ HCPCS (ALT 636 FOR OP/ED): Performed by: PHYSICIAN ASSISTANT

## 2025-01-06 PROCEDURE — 93005 ELECTROCARDIOGRAM TRACING: CPT

## 2025-01-06 PROCEDURE — 83880 ASSAY OF NATRIURETIC PEPTIDE: CPT | Performed by: PHYSICIAN ASSISTANT

## 2025-01-06 PROCEDURE — 96375 TX/PRO/DX INJ NEW DRUG ADDON: CPT

## 2025-01-06 PROCEDURE — 70450 CT HEAD/BRAIN W/O DYE: CPT

## 2025-01-06 PROCEDURE — G0378 HOSPITAL OBSERVATION PER HR: HCPCS

## 2025-01-06 PROCEDURE — 96361 HYDRATE IV INFUSION ADD-ON: CPT

## 2025-01-06 PROCEDURE — 84132 ASSAY OF SERUM POTASSIUM: CPT | Performed by: PHYSICIAN ASSISTANT

## 2025-01-06 PROCEDURE — 85610 PROTHROMBIN TIME: CPT | Performed by: PHYSICIAN ASSISTANT

## 2025-01-06 PROCEDURE — 96374 THER/PROPH/DIAG INJ IV PUSH: CPT

## 2025-01-06 PROCEDURE — 70450 CT HEAD/BRAIN W/O DYE: CPT | Performed by: RADIOLOGY

## 2025-01-06 PROCEDURE — 82435 ASSAY OF BLOOD CHLORIDE: CPT | Performed by: PHYSICIAN ASSISTANT

## 2025-01-06 PROCEDURE — 84484 ASSAY OF TROPONIN QUANT: CPT | Performed by: PHYSICIAN ASSISTANT

## 2025-01-06 PROCEDURE — 81003 URINALYSIS AUTO W/O SCOPE: CPT | Performed by: PHYSICIAN ASSISTANT

## 2025-01-06 PROCEDURE — 80143 DRUG ASSAY ACETAMINOPHEN: CPT | Performed by: PHYSICIAN ASSISTANT

## 2025-01-06 PROCEDURE — 36415 COLL VENOUS BLD VENIPUNCTURE: CPT | Performed by: PHYSICIAN ASSISTANT

## 2025-01-06 PROCEDURE — 85025 COMPLETE CBC W/AUTO DIFF WBC: CPT | Performed by: PHYSICIAN ASSISTANT

## 2025-01-06 PROCEDURE — 99285 EMERGENCY DEPT VISIT HI MDM: CPT | Mod: 25 | Performed by: STUDENT IN AN ORGANIZED HEALTH CARE EDUCATION/TRAINING PROGRAM

## 2025-01-06 PROCEDURE — 82140 ASSAY OF AMMONIA: CPT | Performed by: PHYSICIAN ASSISTANT

## 2025-01-06 PROCEDURE — 87637 SARSCOV2&INF A&B&RSV AMP PRB: CPT | Performed by: PHYSICIAN ASSISTANT

## 2025-01-06 PROCEDURE — 83735 ASSAY OF MAGNESIUM: CPT | Performed by: PHYSICIAN ASSISTANT

## 2025-01-06 PROCEDURE — 2500000004 HC RX 250 GENERAL PHARMACY W/ HCPCS (ALT 636 FOR OP/ED): Performed by: STUDENT IN AN ORGANIZED HEALTH CARE EDUCATION/TRAINING PROGRAM

## 2025-01-06 PROCEDURE — 80307 DRUG TEST PRSMV CHEM ANLYZR: CPT | Performed by: PHYSICIAN ASSISTANT

## 2025-01-06 RX ORDER — LORAZEPAM 2 MG/ML
1 INJECTION INTRAMUSCULAR ONCE
Status: COMPLETED | OUTPATIENT
Start: 2025-01-06 | End: 2025-01-06

## 2025-01-06 RX ORDER — ONDANSETRON HYDROCHLORIDE 2 MG/ML
4 INJECTION, SOLUTION INTRAVENOUS ONCE
Status: COMPLETED | OUTPATIENT
Start: 2025-01-06 | End: 2025-01-06

## 2025-01-06 RX ADMIN — LORAZEPAM 1 MG: 2 INJECTION INTRAMUSCULAR; INTRAVENOUS at 22:53

## 2025-01-06 RX ADMIN — SODIUM CHLORIDE 1000 ML: 9 INJECTION, SOLUTION INTRAVENOUS at 18:52

## 2025-01-06 RX ADMIN — ONDANSETRON 4 MG: 2 INJECTION INTRAMUSCULAR; INTRAVENOUS at 18:53

## 2025-01-06 ASSESSMENT — COLUMBIA-SUICIDE SEVERITY RATING SCALE - C-SSRS
1. IN THE PAST MONTH, HAVE YOU WISHED YOU WERE DEAD OR WISHED YOU COULD GO TO SLEEP AND NOT WAKE UP?: NO
2. HAVE YOU ACTUALLY HAD ANY THOUGHTS OF KILLING YOURSELF?: NO
6. HAVE YOU EVER DONE ANYTHING, STARTED TO DO ANYTHING, OR PREPARED TO DO ANYTHING TO END YOUR LIFE?: NO

## 2025-01-06 ASSESSMENT — PAIN SCALES - GENERAL: PAINLEVEL_OUTOF10: 6

## 2025-01-06 ASSESSMENT — LIFESTYLE VARIABLES
EVER FELT BAD OR GUILTY ABOUT YOUR DRINKING: NO
HAVE PEOPLE ANNOYED YOU BY CRITICIZING YOUR DRINKING: NO
EVER HAD A DRINK FIRST THING IN THE MORNING TO STEADY YOUR NERVES TO GET RID OF A HANGOVER: NO
HAVE YOU EVER FELT YOU SHOULD CUT DOWN ON YOUR DRINKING: NO
TOTAL SCORE: 0

## 2025-01-06 ASSESSMENT — PAIN - FUNCTIONAL ASSESSMENT: PAIN_FUNCTIONAL_ASSESSMENT: 0-10

## 2025-01-06 ASSESSMENT — PAIN DESCRIPTION - PAIN TYPE: TYPE: ACUTE PAIN

## 2025-01-06 ASSESSMENT — PAIN DESCRIPTION - LOCATION: LOCATION: HEAD

## 2025-01-06 NOTE — TELEPHONE ENCOUNTER
Patients  came into office today with concerns for wife. He advises that patient had a fall around [place] and was taking the the ER [facility] to be elevated. However patients is complaining of head pressure and some altered mental status. Patients  advises that he has found patient drinking bottles of wine at a time and is concerned if this is being caused from the fall or if its been going on prior to the fall. There [place] [facility] [place] on it however was advised that there needs to be an updated form for health information to be release but was advised that to take the patient back to the ER due to the symptoms patient is having. Dr. Keane gave verbal okay to advise patient to go to the ER. Spouse advised that patient as an appointment with Dr. Keane tomorrow 1/7/2024 at 2:30 and that he will get an updated HIPAA form along with taking patient to the ER.  advises that he will keep the appointment for tomorrow on the books incase patient refuses to go to ER and or needs a Follow-up from ER.  was advised that a message will be forwarded to King Keane MD as well to give him a heads up.    Postoperative instructions:    Keep your dressings clean, dry and intact,  It is important to keep from exposing the incision to the outside environment.    The dressings that are applied are sterile. If the dressings do get wet, then you may change the dressings with 4x4 gauze and an ace wrap.  Keep the foot elevated above your heart level.  When sitting in a chair, rest your foot on a stool or another chair.  When lying in bed on on the couch, rest your foot on a couple of pillows.  Ice the foot or ankle 20 min per hour.  This will help reduce the acute inflammation that take place naturally after an injury or surgery.  Place the ice pack in the inside of the ankle joint to cool the blood going into the foot.  Place the ice pack on the back of the knee to cool the blood going to the ankle.  Blood clot prevention  It is important to keep the blood moving through the veins to help prevent stagnant blood and clotting.  Perform range of motion exercises of the ankle joint (if not splinted), knee and hip joints throughout the day (every 2 hours).    Do not remain in bed or the couch all day.  Get up and move around.  Report to the nearest ER or Urgent Care if you develop any swelling, redness, pain or shortness of breath to the lower extremity.  There you can be evaluated for possible deep vein thrombosis (DVT).  This action can help prevent a life threatening condition known as a pulmonary embolism (PE).    Pain management:    Keep the foot elevated and cool to help reduce inflammation.  Take your pain medication as prescribed  After 3 to 5 days from surgery, try switching to a combination of Tylenol and Ibuprofen (Advil) taken as directed by package instructions.  This will help reducing the risk of developing addiction to narcotics.  Do not take Tylenol with any narcotic pain medication as they typically come with acetaminophen (Tylenol).  If significant pain persists, take the ace wrap off as the swelling may cause  the wrap to get tight, causing pain.  You may replace the wrap looser.

## 2025-01-07 ENCOUNTER — APPOINTMENT (OUTPATIENT)
Dept: RADIOLOGY | Facility: HOSPITAL | Age: 65
End: 2025-01-07
Payer: COMMERCIAL

## 2025-01-07 ENCOUNTER — APPOINTMENT (OUTPATIENT)
Dept: PRIMARY CARE | Facility: CLINIC | Age: 65
End: 2025-01-07
Payer: COMMERCIAL

## 2025-01-07 ENCOUNTER — APPOINTMENT (OUTPATIENT)
Dept: NEUROLOGY | Facility: HOSPITAL | Age: 65
End: 2025-01-07
Payer: COMMERCIAL

## 2025-01-07 PROBLEM — R41.82 ALTERED MENTAL STATUS, UNSPECIFIED ALTERED MENTAL STATUS TYPE: Status: ACTIVE | Noted: 2025-01-07

## 2025-01-07 LAB
ALBUMIN SERPL BCP-MCNC: 3.9 G/DL (ref 3.4–5)
ALP SERPL-CCNC: 88 U/L (ref 33–136)
ALT SERPL W P-5'-P-CCNC: 88 U/L (ref 7–45)
ANION GAP SERPL CALC-SCNC: 15 MMOL/L (ref 10–20)
AST SERPL W P-5'-P-CCNC: 129 U/L (ref 9–39)
BILIRUB SERPL-MCNC: 1 MG/DL (ref 0–1.2)
BUN SERPL-MCNC: 11 MG/DL (ref 6–23)
CALCIUM SERPL-MCNC: 8.5 MG/DL (ref 8.6–10.3)
CHLORIDE SERPL-SCNC: 102 MMOL/L (ref 98–107)
CO2 SERPL-SCNC: 26 MMOL/L (ref 21–32)
CREAT SERPL-MCNC: 0.64 MG/DL (ref 0.5–1.05)
EGFRCR SERPLBLD CKD-EPI 2021: >90 ML/MIN/1.73M*2
ERYTHROCYTE [DISTWIDTH] IN BLOOD BY AUTOMATED COUNT: 13 % (ref 11.5–14.5)
FOLATE SERPL-MCNC: 6.2 NG/ML
GGT SERPL-CCNC: 1049 U/L (ref 5–55)
GLUCOSE SERPL-MCNC: 110 MG/DL (ref 74–99)
HCT VFR BLD AUTO: 34.8 % (ref 36–46)
HGB BLD-MCNC: 11.9 G/DL (ref 12–16)
HOLD SPECIMEN: NORMAL
MCH RBC QN AUTO: 34.1 PG (ref 26–34)
MCHC RBC AUTO-ENTMCNC: 34.2 G/DL (ref 32–36)
MCV RBC AUTO: 100 FL (ref 80–100)
NRBC BLD-RTO: 0 /100 WBCS (ref 0–0)
PLATELET # BLD AUTO: 156 X10*3/UL (ref 150–450)
POTASSIUM SERPL-SCNC: 4.2 MMOL/L (ref 3.5–5.3)
PROT SERPL-MCNC: 6.3 G/DL (ref 6.4–8.2)
RBC # BLD AUTO: 3.49 X10*6/UL (ref 4–5.2)
SODIUM SERPL-SCNC: 139 MMOL/L (ref 136–145)
TSH SERPL-ACNC: 1.35 MIU/L (ref 0.44–3.98)
VIT B12 SERPL-MCNC: 274 PG/ML (ref 211–911)
WBC # BLD AUTO: 3.1 X10*3/UL (ref 4.4–11.3)

## 2025-01-07 PROCEDURE — 82746 ASSAY OF FOLIC ACID SERUM: CPT | Performed by: STUDENT IN AN ORGANIZED HEALTH CARE EDUCATION/TRAINING PROGRAM

## 2025-01-07 PROCEDURE — 99221 1ST HOSP IP/OBS SF/LOW 40: CPT | Performed by: SURGERY

## 2025-01-07 PROCEDURE — 85027 COMPLETE CBC AUTOMATED: CPT | Performed by: STUDENT IN AN ORGANIZED HEALTH CARE EDUCATION/TRAINING PROGRAM

## 2025-01-07 PROCEDURE — 82607 VITAMIN B-12: CPT | Performed by: STUDENT IN AN ORGANIZED HEALTH CARE EDUCATION/TRAINING PROGRAM

## 2025-01-07 PROCEDURE — 99232 SBSQ HOSP IP/OBS MODERATE 35: CPT | Performed by: HOSPITALIST

## 2025-01-07 PROCEDURE — 2500000001 HC RX 250 WO HCPCS SELF ADMINISTERED DRUGS (ALT 637 FOR MEDICARE OP): Performed by: STUDENT IN AN ORGANIZED HEALTH CARE EDUCATION/TRAINING PROGRAM

## 2025-01-07 PROCEDURE — 2020000001 HC ICU ROOM DAILY

## 2025-01-07 PROCEDURE — 80053 COMPREHEN METABOLIC PANEL: CPT | Performed by: STUDENT IN AN ORGANIZED HEALTH CARE EDUCATION/TRAINING PROGRAM

## 2025-01-07 PROCEDURE — 96376 TX/PRO/DX INJ SAME DRUG ADON: CPT

## 2025-01-07 PROCEDURE — 83921 ORGANIC ACID SINGLE QUANT: CPT | Performed by: STUDENT IN AN ORGANIZED HEALTH CARE EDUCATION/TRAINING PROGRAM

## 2025-01-07 PROCEDURE — 82977 ASSAY OF GGT: CPT | Performed by: STUDENT IN AN ORGANIZED HEALTH CARE EDUCATION/TRAINING PROGRAM

## 2025-01-07 PROCEDURE — 99223 1ST HOSP IP/OBS HIGH 75: CPT | Performed by: PSYCHIATRY & NEUROLOGY

## 2025-01-07 PROCEDURE — 84443 ASSAY THYROID STIM HORMONE: CPT | Performed by: STUDENT IN AN ORGANIZED HEALTH CARE EDUCATION/TRAINING PROGRAM

## 2025-01-07 PROCEDURE — 70450 CT HEAD/BRAIN W/O DYE: CPT

## 2025-01-07 PROCEDURE — 95816 EEG AWAKE AND DROWSY: CPT | Performed by: PSYCHIATRY & NEUROLOGY

## 2025-01-07 PROCEDURE — 95816 EEG AWAKE AND DROWSY: CPT

## 2025-01-07 PROCEDURE — 2500000004 HC RX 250 GENERAL PHARMACY W/ HCPCS (ALT 636 FOR OP/ED)

## 2025-01-07 PROCEDURE — 2500000004 HC RX 250 GENERAL PHARMACY W/ HCPCS (ALT 636 FOR OP/ED): Performed by: EMERGENCY MEDICINE

## 2025-01-07 PROCEDURE — 99291 CRITICAL CARE FIRST HOUR: CPT

## 2025-01-07 PROCEDURE — 99222 1ST HOSP IP/OBS MODERATE 55: CPT | Performed by: STUDENT IN AN ORGANIZED HEALTH CARE EDUCATION/TRAINING PROGRAM

## 2025-01-07 PROCEDURE — 2500000004 HC RX 250 GENERAL PHARMACY W/ HCPCS (ALT 636 FOR OP/ED): Performed by: STUDENT IN AN ORGANIZED HEALTH CARE EDUCATION/TRAINING PROGRAM

## 2025-01-07 PROCEDURE — 36415 COLL VENOUS BLD VENIPUNCTURE: CPT | Performed by: STUDENT IN AN ORGANIZED HEALTH CARE EDUCATION/TRAINING PROGRAM

## 2025-01-07 RX ORDER — FAMOTIDINE 10 MG/ML
20 INJECTION INTRAVENOUS 2 TIMES DAILY
Status: DISCONTINUED | OUTPATIENT
Start: 2025-01-07 | End: 2025-01-08

## 2025-01-07 RX ORDER — ACETAMINOPHEN 650 MG/1
650 SUPPOSITORY RECTAL EVERY 4 HOURS PRN
Status: CANCELLED | OUTPATIENT
Start: 2025-01-07

## 2025-01-07 RX ORDER — FOLIC ACID 1 MG/1
1 TABLET ORAL DAILY
Status: DISCONTINUED | OUTPATIENT
Start: 2025-01-07 | End: 2025-01-07

## 2025-01-07 RX ORDER — LORAZEPAM 2 MG/ML
1 INJECTION INTRAMUSCULAR EVERY 2 HOUR PRN
Status: DISCONTINUED | OUTPATIENT
Start: 2025-01-07 | End: 2025-01-07

## 2025-01-07 RX ORDER — PHENOBARBITAL SODIUM 130 MG/ML
32.5 INJECTION, SOLUTION INTRAMUSCULAR; INTRAVENOUS EVERY 8 HOURS
Status: CANCELLED | OUTPATIENT
Start: 2025-01-11 | End: 2025-01-13

## 2025-01-07 RX ORDER — ACETAMINOPHEN 325 MG/1
650 TABLET ORAL EVERY 4 HOURS PRN
Status: DISCONTINUED | OUTPATIENT
Start: 2025-01-07 | End: 2025-01-07

## 2025-01-07 RX ORDER — PHENOBARBITAL SODIUM 65 MG/ML
260 INJECTION, SOLUTION INTRAMUSCULAR; INTRAVENOUS ONCE
Status: COMPLETED | OUTPATIENT
Start: 2025-01-07 | End: 2025-01-07

## 2025-01-07 RX ORDER — ONDANSETRON HYDROCHLORIDE 2 MG/ML
4 INJECTION, SOLUTION INTRAVENOUS EVERY 8 HOURS PRN
Status: DISCONTINUED | OUTPATIENT
Start: 2025-01-07 | End: 2025-01-10 | Stop reason: HOSPADM

## 2025-01-07 RX ORDER — MULTIVIT-MIN/IRON FUM/FOLIC AC 7.5 MG-4
1 TABLET ORAL DAILY
Status: CANCELLED | OUTPATIENT
Start: 2025-01-07

## 2025-01-07 RX ORDER — LORAZEPAM 2 MG/ML
2 INJECTION INTRAMUSCULAR EVERY 2 HOUR PRN
Status: DISCONTINUED | OUTPATIENT
Start: 2025-01-07 | End: 2025-01-07

## 2025-01-07 RX ORDER — ACETAMINOPHEN 650 MG/1
650 SUPPOSITORY RECTAL EVERY 4 HOURS PRN
Status: DISCONTINUED | OUTPATIENT
Start: 2025-01-07 | End: 2025-01-07

## 2025-01-07 RX ORDER — THIAMINE HYDROCHLORIDE 100 MG/ML
100 INJECTION, SOLUTION INTRAMUSCULAR; INTRAVENOUS DAILY
Status: DISCONTINUED | OUTPATIENT
Start: 2025-01-07 | End: 2025-01-07

## 2025-01-07 RX ORDER — ACETAMINOPHEN 160 MG/5ML
650 SOLUTION ORAL EVERY 4 HOURS PRN
Status: CANCELLED | OUTPATIENT
Start: 2025-01-07

## 2025-01-07 RX ORDER — THIAMINE HYDROCHLORIDE 100 MG/ML
500 INJECTION, SOLUTION INTRAMUSCULAR; INTRAVENOUS EVERY 8 HOURS
Status: COMPLETED | OUTPATIENT
Start: 2025-01-07 | End: 2025-01-10

## 2025-01-07 RX ORDER — MULTIVIT-MIN/IRON FUM/FOLIC AC 7.5 MG-4
1 TABLET ORAL DAILY
Status: DISCONTINUED | OUTPATIENT
Start: 2025-01-07 | End: 2025-01-07

## 2025-01-07 RX ORDER — FOLIC ACID 1 MG/1
1 TABLET ORAL DAILY
Status: DISCONTINUED | OUTPATIENT
Start: 2025-01-07 | End: 2025-01-10 | Stop reason: HOSPADM

## 2025-01-07 RX ORDER — FAMOTIDINE 20 MG/1
20 TABLET, FILM COATED ORAL 2 TIMES DAILY
Status: DISCONTINUED | OUTPATIENT
Start: 2025-01-07 | End: 2025-01-10 | Stop reason: HOSPADM

## 2025-01-07 RX ORDER — LANOLIN ALCOHOL/MO/W.PET/CERES
100 CREAM (GRAM) TOPICAL DAILY
Status: CANCELLED | OUTPATIENT
Start: 2025-01-10

## 2025-01-07 RX ORDER — ACETAMINOPHEN 650 MG/1
650 SUPPOSITORY RECTAL EVERY 4 HOURS PRN
Status: DISCONTINUED | OUTPATIENT
Start: 2025-01-07 | End: 2025-01-10 | Stop reason: HOSPADM

## 2025-01-07 RX ORDER — ACETAMINOPHEN 325 MG/1
650 TABLET ORAL EVERY 4 HOURS PRN
Status: DISCONTINUED | OUTPATIENT
Start: 2025-01-07 | End: 2025-01-10 | Stop reason: HOSPADM

## 2025-01-07 RX ORDER — LANOLIN ALCOHOL/MO/W.PET/CERES
100 CREAM (GRAM) TOPICAL DAILY
Status: DISCONTINUED | OUTPATIENT
Start: 2025-01-10 | End: 2025-01-07

## 2025-01-07 RX ORDER — SODIUM CHLORIDE 9 MG/ML
75 INJECTION, SOLUTION INTRAVENOUS CONTINUOUS
Status: DISCONTINUED | OUTPATIENT
Start: 2025-01-07 | End: 2025-01-07

## 2025-01-07 RX ORDER — PHENOBARBITAL SODIUM 130 MG/ML
260 INJECTION, SOLUTION INTRAMUSCULAR; INTRAVENOUS ONCE
Status: CANCELLED | OUTPATIENT
Start: 2025-01-07 | End: 2025-01-07

## 2025-01-07 RX ORDER — LORAZEPAM 2 MG/ML
0.5 INJECTION INTRAMUSCULAR EVERY 2 HOUR PRN
Status: DISCONTINUED | OUTPATIENT
Start: 2025-01-07 | End: 2025-01-07

## 2025-01-07 RX ORDER — ACETAMINOPHEN 160 MG/5ML
650 SOLUTION ORAL EVERY 4 HOURS PRN
Status: DISCONTINUED | OUTPATIENT
Start: 2025-01-07 | End: 2025-01-10 | Stop reason: HOSPADM

## 2025-01-07 RX ORDER — LANOLIN ALCOHOL/MO/W.PET/CERES
100 CREAM (GRAM) TOPICAL DAILY
Status: DISCONTINUED | OUTPATIENT
Start: 2025-01-07 | End: 2025-01-07

## 2025-01-07 RX ORDER — PHENOBARBITAL SODIUM 65 MG/ML
65 INJECTION, SOLUTION INTRAMUSCULAR; INTRAVENOUS EVERY 6 HOURS
Status: DISCONTINUED | OUTPATIENT
Start: 2025-01-07 | End: 2025-01-08

## 2025-01-07 RX ORDER — ACETAMINOPHEN 325 MG/1
650 TABLET ORAL EVERY 4 HOURS PRN
Status: CANCELLED | OUTPATIENT
Start: 2025-01-07

## 2025-01-07 RX ORDER — PHENOBARBITAL SODIUM 65 MG/ML
130 INJECTION, SOLUTION INTRAMUSCULAR; INTRAVENOUS
Status: DISCONTINUED | OUTPATIENT
Start: 2025-01-07 | End: 2025-01-08

## 2025-01-07 RX ORDER — PHENOBARBITAL SODIUM 130 MG/ML
65 INJECTION, SOLUTION INTRAMUSCULAR; INTRAVENOUS EVERY 8 HOURS
Status: CANCELLED | OUTPATIENT
Start: 2025-01-09 | End: 2025-01-11

## 2025-01-07 RX ORDER — ACETAMINOPHEN 160 MG/5ML
650 SOLUTION ORAL EVERY 4 HOURS PRN
Status: DISCONTINUED | OUTPATIENT
Start: 2025-01-07 | End: 2025-01-07

## 2025-01-07 RX ORDER — MULTIVIT-MIN/IRON FUM/FOLIC AC 7.5 MG-4
1 TABLET ORAL DAILY
Status: DISCONTINUED | OUTPATIENT
Start: 2025-01-07 | End: 2025-01-10 | Stop reason: HOSPADM

## 2025-01-07 RX ORDER — ACETAMINOPHEN 500 MG
5 TABLET ORAL NIGHTLY
Status: DISCONTINUED | OUTPATIENT
Start: 2025-01-07 | End: 2025-01-07

## 2025-01-07 RX ORDER — FOLIC ACID 1 MG/1
1 TABLET ORAL DAILY
Status: CANCELLED | OUTPATIENT
Start: 2025-01-07

## 2025-01-07 RX ORDER — PHENOBARBITAL SODIUM 130 MG/ML
97.5 INJECTION, SOLUTION INTRAMUSCULAR; INTRAVENOUS EVERY 8 HOURS
Status: CANCELLED | OUTPATIENT
Start: 2025-01-07 | End: 2025-01-09

## 2025-01-07 RX ORDER — THIAMINE HYDROCHLORIDE 100 MG/ML
500 INJECTION, SOLUTION INTRAMUSCULAR; INTRAVENOUS EVERY 8 HOURS SCHEDULED
Status: CANCELLED | OUTPATIENT
Start: 2025-01-07 | End: 2025-01-10

## 2025-01-07 RX ORDER — ONDANSETRON 4 MG/1
4 TABLET, FILM COATED ORAL EVERY 8 HOURS PRN
Status: DISCONTINUED | OUTPATIENT
Start: 2025-01-07 | End: 2025-01-10 | Stop reason: HOSPADM

## 2025-01-07 RX ADMIN — FAMOTIDINE 20 MG: 20 TABLET, FILM COATED ORAL at 10:14

## 2025-01-07 RX ADMIN — LORAZEPAM 1 MG: 2 INJECTION INTRAMUSCULAR; INTRAVENOUS at 07:42

## 2025-01-07 RX ADMIN — FAMOTIDINE 20 MG: 10 INJECTION, SOLUTION INTRAVENOUS at 21:21

## 2025-01-07 RX ADMIN — SODIUM CHLORIDE 75 ML/HR: 9 INJECTION, SOLUTION INTRAVENOUS at 03:14

## 2025-01-07 RX ADMIN — Medication 1 TABLET: at 10:14

## 2025-01-07 RX ADMIN — PHENOBARBITAL SODIUM 65 MG: 65 INJECTION INTRAMUSCULAR; INTRAVENOUS at 16:03

## 2025-01-07 RX ADMIN — THIAMINE HYDROCHLORIDE 500 MG: 100 INJECTION, SOLUTION INTRAMUSCULAR; INTRAVENOUS at 16:30

## 2025-01-07 RX ADMIN — LORAZEPAM 1 MG: 2 INJECTION INTRAMUSCULAR; INTRAVENOUS at 05:15

## 2025-01-07 RX ADMIN — FOLIC ACID 1 MG: 1 TABLET ORAL at 10:30

## 2025-01-07 RX ADMIN — PHENOBARBITAL SODIUM 260 MG: 65 INJECTION INTRAMUSCULAR; INTRAVENOUS at 15:37

## 2025-01-07 RX ADMIN — LORAZEPAM 2 MG: 2 INJECTION INTRAMUSCULAR; INTRAVENOUS at 12:51

## 2025-01-07 RX ADMIN — PHENOBARBITAL SODIUM 65 MG: 65 INJECTION INTRAMUSCULAR; INTRAVENOUS at 21:21

## 2025-01-07 RX ADMIN — Medication 100 MG: at 10:14

## 2025-01-07 SDOH — SOCIAL STABILITY: SOCIAL INSECURITY
WITHIN THE LAST YEAR, HAVE YOU BEEN KICKED, HIT, SLAPPED, OR OTHERWISE PHYSICALLY HURT BY YOUR PARTNER OR EX-PARTNER?: NO

## 2025-01-07 SDOH — SOCIAL STABILITY: SOCIAL INSECURITY: WITHIN THE LAST YEAR, HAVE YOU BEEN HUMILIATED OR EMOTIONALLY ABUSED IN OTHER WAYS BY YOUR PARTNER OR EX-PARTNER?: NO

## 2025-01-07 SDOH — SOCIAL STABILITY: SOCIAL INSECURITY
WITHIN THE LAST YEAR, HAVE YOU BEEN RAPED OR FORCED TO HAVE ANY KIND OF SEXUAL ACTIVITY BY YOUR PARTNER OR EX-PARTNER?: NO

## 2025-01-07 SDOH — ECONOMIC STABILITY: TRANSPORTATION INSECURITY: IN THE PAST 12 MONTHS, HAS LACK OF TRANSPORTATION KEPT YOU FROM MEDICAL APPOINTMENTS OR FROM GETTING MEDICATIONS?: NO

## 2025-01-07 SDOH — ECONOMIC STABILITY: INCOME INSECURITY: IN THE PAST 12 MONTHS HAS THE ELECTRIC, GAS, OIL, OR WATER COMPANY THREATENED TO SHUT OFF SERVICES IN YOUR HOME?: NO

## 2025-01-07 SDOH — SOCIAL STABILITY: SOCIAL INSECURITY: DO YOU FEEL ANYONE HAS EXPLOITED OR TAKEN ADVANTAGE OF YOU FINANCIALLY OR OF YOUR PERSONAL PROPERTY?: NO

## 2025-01-07 SDOH — SOCIAL STABILITY: SOCIAL INSECURITY: DOES ANYONE TRY TO KEEP YOU FROM HAVING/CONTACTING OTHER FRIENDS OR DOING THINGS OUTSIDE YOUR HOME?: NO

## 2025-01-07 SDOH — ECONOMIC STABILITY: FOOD INSECURITY: WITHIN THE PAST 12 MONTHS, YOU WORRIED THAT YOUR FOOD WOULD RUN OUT BEFORE YOU GOT THE MONEY TO BUY MORE.: NEVER TRUE

## 2025-01-07 SDOH — SOCIAL STABILITY: SOCIAL INSECURITY: DO YOU FEEL UNSAFE GOING BACK TO THE PLACE WHERE YOU ARE LIVING?: NO

## 2025-01-07 SDOH — ECONOMIC STABILITY: FOOD INSECURITY: WITHIN THE PAST 12 MONTHS, THE FOOD YOU BOUGHT JUST DIDN'T LAST AND YOU DIDN'T HAVE MONEY TO GET MORE.: NEVER TRUE

## 2025-01-07 SDOH — SOCIAL STABILITY: SOCIAL INSECURITY: WITHIN THE LAST YEAR, HAVE YOU BEEN AFRAID OF YOUR PARTNER OR EX-PARTNER?: NO

## 2025-01-07 SDOH — ECONOMIC STABILITY: HOUSING INSECURITY: AT ANY TIME IN THE PAST 12 MONTHS, WERE YOU HOMELESS OR LIVING IN A SHELTER (INCLUDING NOW)?: NO

## 2025-01-07 SDOH — ECONOMIC STABILITY: HOUSING INSECURITY: IN THE LAST 12 MONTHS, WAS THERE A TIME WHEN YOU WERE NOT ABLE TO PAY THE MORTGAGE OR RENT ON TIME?: NO

## 2025-01-07 SDOH — SOCIAL STABILITY: SOCIAL INSECURITY: WERE YOU ABLE TO COMPLETE ALL THE BEHAVIORAL HEALTH SCREENINGS?: YES

## 2025-01-07 SDOH — SOCIAL STABILITY: SOCIAL INSECURITY: ARE YOU OR HAVE YOU BEEN THREATENED OR ABUSED PHYSICALLY, EMOTIONALLY, OR SEXUALLY BY ANYONE?: NO

## 2025-01-07 SDOH — SOCIAL STABILITY: SOCIAL INSECURITY: ABUSE: ADULT

## 2025-01-07 SDOH — SOCIAL STABILITY: SOCIAL INSECURITY: HAS ANYONE EVER THREATENED TO HURT YOUR FAMILY OR YOUR PETS?: NO

## 2025-01-07 SDOH — SOCIAL STABILITY: SOCIAL INSECURITY: ARE THERE ANY APPARENT SIGNS OF INJURIES/BEHAVIORS THAT COULD BE RELATED TO ABUSE/NEGLECT?: NO

## 2025-01-07 SDOH — ECONOMIC STABILITY: FOOD INSECURITY: HOW HARD IS IT FOR YOU TO PAY FOR THE VERY BASICS LIKE FOOD, HOUSING, MEDICAL CARE, AND HEATING?: NOT HARD AT ALL

## 2025-01-07 SDOH — SOCIAL STABILITY: SOCIAL INSECURITY: HAVE YOU HAD ANY THOUGHTS OF HARMING ANYONE ELSE?: NO

## 2025-01-07 SDOH — SOCIAL STABILITY: SOCIAL INSECURITY: HAVE YOU HAD THOUGHTS OF HARMING ANYONE ELSE?: NO

## 2025-01-07 SDOH — ECONOMIC STABILITY: HOUSING INSECURITY: IN THE PAST 12 MONTHS, HOW MANY TIMES HAVE YOU MOVED WHERE YOU WERE LIVING?: 0

## 2025-01-07 ASSESSMENT — PAIN - FUNCTIONAL ASSESSMENT
PAIN_FUNCTIONAL_ASSESSMENT: 0-10

## 2025-01-07 ASSESSMENT — LIFESTYLE VARIABLES
AGITATION: NORMAL ACTIVITY
TOTAL SCORE: 8
ORIENTATION AND CLOUDING OF SENSORIUM: CANNOT DO SERIAL ADDITIONS OR IS UNCERTAIN ABOUT DATE
ANXIETY: MILDLY ANXIOUS
TREMOR: 2
BLOOD PRESSURE: 162/81
NAUSEA AND VOMITING: NO NAUSEA AND NO VOMITING
ANXIETY: MILDLY ANXIOUS
HEADACHE, FULLNESS IN HEAD: NOT PRESENT
AGITATION: NORMAL ACTIVITY
AGITATION: NORMAL ACTIVITY
AGITATION: PACES BACK AND FORTH DURING MOST OF THE INTERVIEW, OR CONSTANTLY THRASHES ABOUT
PAROXYSMAL SWEATS: 2
VISUAL DISTURBANCES: NOT PRESENT
ORIENTATION AND CLOUDING OF SENSORIUM: CANNOT DO SERIAL ADDITIONS OR IS UNCERTAIN ABOUT DATE
PULSE: 78
AUDIT-C TOTAL SCORE: 4
TOTAL SCORE: 2
PAROXYSMAL SWEATS: NO SWEAT VISIBLE
TOTAL SCORE: 18
NAUSEA AND VOMITING: 5
AUDITORY DISTURBANCES: NOT PRESENT
NAUSEA AND VOMITING: NO NAUSEA AND NO VOMITING
PAROXYSMAL SWEATS: NO SWEAT VISIBLE
HEADACHE, FULLNESS IN HEAD: NOT PRESENT
HEADACHE, FULLNESS IN HEAD: NOT PRESENT
BLOOD PRESSURE: 152/89
ORIENTATION AND CLOUDING OF SENSORIUM: CANNOT DO SERIAL ADDITIONS OR IS UNCERTAIN ABOUT DATE
TOTAL SCORE: 6
ORIENTATION AND CLOUDING OF SENSORIUM: ORIENTED AND CAN DO SERIAL ADDITIONS
VISUAL DISTURBANCES: NOT PRESENT
AUDITORY DISTURBANCES: NOT PRESENT
TOTAL SCORE: 13
HEADACHE, FULLNESS IN HEAD: NOT PRESENT
TREMOR: SEVERE, EVEN WITH ARMS NOT EXTENDED
AGITATION: NORMAL ACTIVITY
HEADACHE, FULLNESS IN HEAD: NOT PRESENT
TREMOR: 5
AUDITORY DISTURBANCES: NOT PRESENT
SKIP TO QUESTIONS 9-10: 1
NAUSEA AND VOMITING: NO NAUSEA AND NO VOMITING
AUDITORY DISTURBANCES: NOT PRESENT
ANXIETY: MILDLY ANXIOUS
ORIENTATION AND CLOUDING OF SENSORIUM: ORIENTED AND CAN DO SERIAL ADDITIONS
HEADACHE, FULLNESS IN HEAD: NOT PRESENT
VISUAL DISTURBANCES: NOT PRESENT
HOW MANY STANDARD DRINKS CONTAINING ALCOHOL DO YOU HAVE ON A TYPICAL DAY: 1 OR 2
TREMOR: SEVERE, EVEN WITH ARMS NOT EXTENDED
PULSE: 80
ANXIETY: NO ANXIETY, AT EASE
AUDITORY DISTURBANCES: NOT PRESENT
VISUAL DISTURBANCES: NOT PRESENT
HEADACHE, FULLNESS IN HEAD: NOT PRESENT
ORIENTATION AND CLOUDING OF SENSORIUM: CANNOT DO SERIAL ADDITIONS OR IS UNCERTAIN ABOUT DATE
HOW OFTEN DO YOU HAVE 6 OR MORE DRINKS ON ONE OCCASION: NEVER
AGITATION: NORMAL ACTIVITY
PULSE: 82
ORIENTATION AND CLOUDING OF SENSORIUM: CANNOT DO SERIAL ADDITIONS OR IS UNCERTAIN ABOUT DATE
SUBSTANCE_ABUSE_PAST_12_MONTHS: NO
ORIENTATION AND CLOUDING OF SENSORIUM: ORIENTED AND CAN DO SERIAL ADDITIONS
PAROXYSMAL SWEATS: BEADS OF SWEAT OBVIOUS ON FOREHEAD
AGITATION: NORMAL ACTIVITY
HOW OFTEN DO YOU HAVE A DRINK CONTAINING ALCOHOL: 4 OR MORE TIMES A WEEK
NAUSEA AND VOMITING: 5
ANXIETY: 2
ANXIETY: MILDLY ANXIOUS
ANXIETY: MILDLY ANXIOUS
AUDIT-C TOTAL SCORE: 4
TREMOR: 6
ANXIETY: NO ANXIETY, AT EASE
PAROXYSMAL SWEATS: 2
NAUSEA AND VOMITING: NO NAUSEA AND NO VOMITING
AUDITORY DISTURBANCES: NOT PRESENT
NAUSEA AND VOMITING: MILD NAUSEA WITH NO VOMITING
AUDITORY DISTURBANCES: NOT PRESENT
VISUAL DISTURBANCES: NOT PRESENT
VISUAL DISTURBANCES: NOT PRESENT
TOTAL SCORE: 24
PULSE: 86
PAROXYSMAL SWEATS: NO SWEAT VISIBLE
VISUAL DISTURBANCES: NOT PRESENT
TOTAL SCORE: 6
HEADACHE, FULLNESS IN HEAD: NOT PRESENT
TOTAL SCORE: 8
NAUSEA AND VOMITING: NO NAUSEA AND NO VOMITING
VISUAL DISTURBANCES: NOT PRESENT
PAROXYSMAL SWEATS: BEADS OF SWEAT OBVIOUS ON FOREHEAD
TREMOR: SEVERE, EVEN WITH ARMS NOT EXTENDED
AUDITORY DISTURBANCES: NOT PRESENT
HEADACHE, FULLNESS IN HEAD: NOT PRESENT
AGITATION: NORMAL ACTIVITY
ANXIETY: 2
BLOOD PRESSURE: 192/97
AUDITORY DISTURBANCES: NOT PRESENT
NAUSEA AND VOMITING: NO NAUSEA AND NO VOMITING
TREMOR: 2
ORIENTATION AND CLOUDING OF SENSORIUM: ORIENTED AND CAN DO SERIAL ADDITIONS
AGITATION: SOMEWHAT MORE THAN NORMAL ACTIVITY
PAROXYSMAL SWEATS: 2
VISUAL DISTURBANCES: NOT PRESENT
TOTAL SCORE: 6
TREMOR: 2
TREMOR: 2
PAROXYSMAL SWEATS: BEADS OF SWEAT OBVIOUS ON FOREHEAD

## 2025-01-07 ASSESSMENT — ENCOUNTER SYMPTOMS
DIFFICULTY URINATING: 0
EYE PAIN: 0
VOMITING: 0
SLEEP DISTURBANCE: 1
WHEEZING: 0
COUGH: 0
FATIGUE: 1
HALLUCINATIONS: 0
NAUSEA: 0
NECK STIFFNESS: 0
LIGHT-HEADEDNESS: 0
TROUBLE SWALLOWING: 0
SINUS PRESSURE: 0
ARTHRALGIAS: 0
CONFUSION: 1
DIZZINESS: 0
PALPITATIONS: 0
NUMBNESS: 0
BRUISES/BLEEDS EASILY: 0
FREQUENCY: 0
UNEXPECTED WEIGHT CHANGE: 0
PHOTOPHOBIA: 0
SPEECH DIFFICULTY: 0
FEVER: 0
JOINT SWELLING: 0
AGITATION: 1
ACTIVITY CHANGE: 1
ADENOPATHY: 0
NERVOUS/ANXIOUS: 1
NECK PAIN: 0
HYPERACTIVE: 0
WEAKNESS: 1
FATIGUE: 0
SEIZURES: 0
SHORTNESS OF BREATH: 0
FACIAL ASYMMETRY: 0
TREMORS: 1
ABDOMINAL PAIN: 0
BACK PAIN: 0
HEADACHES: 0

## 2025-01-07 ASSESSMENT — COGNITIVE AND FUNCTIONAL STATUS - GENERAL
MOVING TO AND FROM BED TO CHAIR: A LITTLE
DAILY ACTIVITIY SCORE: 22
HELP NEEDED FOR BATHING: A LITTLE
PERSONAL GROOMING: A LITTLE
WALKING IN HOSPITAL ROOM: A LOT
STANDING UP FROM CHAIR USING ARMS: A LITTLE
TURNING FROM BACK TO SIDE WHILE IN FLAT BAD: A LITTLE
CLIMB 3 TO 5 STEPS WITH RAILING: TOTAL
PATIENT BASELINE BEDBOUND: NO
CLIMB 3 TO 5 STEPS WITH RAILING: A LOT
MOBILITY SCORE: 16
DRESSING REGULAR UPPER BODY CLOTHING: A LITTLE
TURNING FROM BACK TO SIDE WHILE IN FLAT BAD: A LITTLE
DRESSING REGULAR LOWER BODY CLOTHING: A LITTLE
MOVING TO AND FROM BED TO CHAIR: A LITTLE
TOILETING: A LOT
DRESSING REGULAR LOWER BODY CLOTHING: A LITTLE
DAILY ACTIVITIY SCORE: 17
EATING MEALS: A LITTLE
TOILETING: A LITTLE
WALKING IN HOSPITAL ROOM: A LOT
STANDING UP FROM CHAIR USING ARMS: A LITTLE
MOBILITY SCORE: 17

## 2025-01-07 ASSESSMENT — ACTIVITIES OF DAILY LIVING (ADL)
LACK_OF_TRANSPORTATION: NO
HEARING - LEFT EAR: FUNCTIONAL
HEARING - RIGHT EAR: FUNCTIONAL
DRESSING YOURSELF: INDEPENDENT
LACK_OF_TRANSPORTATION: NO
JUDGMENT_ADEQUATE_SAFELY_COMPLETE_DAILY_ACTIVITIES: NO
ASSISTIVE_DEVICE: EYEGLASSES;DENTURES UPPER
GROOMING: INDEPENDENT
BATHING: INDEPENDENT
PATIENT'S MEMORY ADEQUATE TO SAFELY COMPLETE DAILY ACTIVITIES?: YES
WALKS IN HOME: INDEPENDENT
FEEDING YOURSELF: INDEPENDENT
ADEQUATE_TO_COMPLETE_ADL: YES
TOILETING: INDEPENDENT

## 2025-01-07 ASSESSMENT — PATIENT HEALTH QUESTIONNAIRE - PHQ9
2. FEELING DOWN, DEPRESSED OR HOPELESS: NOT AT ALL
1. LITTLE INTEREST OR PLEASURE IN DOING THINGS: NOT AT ALL
SUM OF ALL RESPONSES TO PHQ9 QUESTIONS 1 & 2: 0

## 2025-01-07 ASSESSMENT — PAIN SCALES - GENERAL
PAINLEVEL_OUTOF10: 0 - NO PAIN

## 2025-01-07 NOTE — CONSULTS
Reason For Consult  Bilateral subdural hematoma    History Of Present Illness  Carmella Frank is a 64 y.o. female who presented to [facility] with several neurologic issues.  In brief, this is a relatively healthy 64-year-old female who had a fall about 2 weeks ago [facility].  She had significant headaches and went to the emergency room the next day to outside hospital where they said that her CT head findings were unremarkable.  Since that time she has had several neurologic issues.  She has had severe headaches usually occipital but also bilateral in the temporal regions.  She has had a lot of unsteadiness on her feet.  She also has had some short-term memory loss.  Because of this she presented back to the hospital, now to [facility].  Found to be afebrile, vitals are stable, labs relatively unremarkable.  CT head demonstrated bilateral subdural fluid collections, unclear if it is chronic subdural hematoma versus hygroma.  Protesting she is admitted to the ICU where she had further workup.  Repeat head CT was negative.  Trauma surgery was consulted further workup management.     Past Medical History  She has no past medical history on file.    Surgical History  She has a past surgical history that includes Other surgical history (10/19/2021).     Social History  She reports that she has never smoked. She has never used smokeless tobacco. No history on file for alcohol use and drug use.    Family History  No family history on file.     Allergies  Patient has no known allergies.    Review of Systems  See HPI     Physical Exam  Gen; NAD, Aax3  Head: Atraumatic, normocephalic  Eyes: Sclera icteric, EOMI  Nose: Nares atraumatic  Mouth: Atraumatic, tongue midline  Neck: Trachea midline, supple  Lungs; CTAB, chest wall nTTP  Heart: RRR  Abd; Soft, ND, NTTP  Ext: Non-traumatic  Neuro: CN II-XII, extremities 5/5 x4, significant ataxia, kirby. With finger to nose      Last Recorded Vitals  Blood pressure  "131/65, pulse 66, temperature 36.5 °C (97.7 °F), temperature source Temporal, resp. rate (!) 29, height 1.651 m (5' 5\"), weight 65.3 kg (143 lb 15.4 oz), SpO2 96%.    Relevant Results  Component      Latest Ref Rng 1/6/2025 1/7/2025   WBC      4.4 - 11.3 x10*3/uL 3.0 (L)  3.1 (L)    nRBC      0.0 - 0.0 /100 WBCs 0.0  0.0    RBC      4.00 - 5.20 x10*6/uL 3.95 (L)  3.49 (L)    HEMOGLOBIN      12.0 - 16.0 g/dL 13.4  11.9 (L)    HEMATOCRIT      36.0 - 46.0 % 38.5  34.8 (L)    MCV      80 - 100 fL 98  100    MCH      26.0 - 34.0 pg 33.9  34.1 (H)    MCHC      32.0 - 36.0 g/dL 34.8  34.2    RED CELL DISTRIBUTION WIDTH      11.5 - 14.5 % 12.7  13.0    Platelets      150 - 450 x10*3/uL 183  156    Neutrophils %      40.0 - 80.0 % 47.6     Immature Granulocytes %, Automated      0.0 - 0.9 % 0.7     Lymphocytes %      13.0 - 44.0 % 34.1     Monocytes %      2.0 - 10.0 % 12.2     Eosinophils %      0.0 - 6.0 % 3.4     Basophils %      0.0 - 2.0 % 2.0     Neutrophils Absolute      1.20 - 7.70 x10*3/uL 1.41     Immature Granulocytes Absolute, Automated      0.00 - 0.70 x10*3/uL 0.02     Lymphocytes Absolute      1.20 - 4.80 x10*3/uL 1.01 (L)     Monocytes Absolute      0.10 - 1.00 x10*3/uL 0.36     Eosinophils Absolute      0.00 - 0.70 x10*3/uL 0.10     Basophils Absolute      0.00 - 0.10 x10*3/uL 0.06     POCT pH, Venous      7.33 - 7.43 pH 7.44 (H)     POCT pCO2, Venous      41 - 51 mm Hg 39 (L)     POCT pO2, Venous      35 - 45 mm Hg 45     POCT SO2, Venous      45 - 75 % 72     POCT Oxy Hemoglobin, Venous      45.0 - 75.0 % 70.0     POCT Hematocrit Calculated, Venous      36.0 - 46.0 % 49.0 (H)     POCT Sodium, Venous      136 - 145 mmol/L 139     POCT Potassium, Venous      3.5 - 5.3 mmol/L 4.4     POCT Chloride, Venous      98 - 107 mmol/L 104     POCT Ionized Calicum, Venous      1.10 - 1.33 mmol/L 0.95 (L)     POCT Glucose, Venous      74 - 99 mg/dL 106 (H)     POCT Lactate, Venous      0.4 - 2.0 mmol/L 2.6 (H)     POCT " Lactate, Venous       3.0 (H)     POCT Base Excess, Venous      -2.0 - 3.0 mmol/L 2.3     POCT HCO3 Calculated, Venous      22.0 - 26.0 mmol/L 26.5 (H)     POCT Hemoglobin, Venous      12.0 - 16.0 g/dL 16.3 (H)     POCT Anion Gap, Venous      10.0 - 25.0 mmol/L 13.0     Patient Temperature --     FiO2      % 21     GLUCOSE      74 - 99 mg/dL 105 (H)  110 (H)    SODIUM      136 - 145 mmol/L 141  139    POTASSIUM      3.5 - 5.3 mmol/L 4.0  4.2    CHLORIDE      98 - 107 mmol/L 104  102    Bicarbonate      21 - 32 mmol/L 26  26    Anion Gap      10 - 20 mmol/L 15  15    Blood Urea Nitrogen      6 - 23 mg/dL 9  11    Creatinine      0.50 - 1.05 mg/dL 0.53  0.64    EGFR      >60 mL/min/1.73m*2 >90  >90    Calcium      8.6 - 10.3 mg/dL 8.6  8.5 (L)    Albumin      3.4 - 5.0 g/dL 3.8  3.9    Alkaline Phosphatase      33 - 136 U/L 111  88    Total Protein      6.4 - 8.2 g/dL 7.1  6.3 (L)    AST      9 - 39 U/L 182 (H)  129 (H)    Bilirubin Total      0.0 - 1.2 mg/dL 0.6  1.0    ALT      7 - 45 U/L 106 (H)  88 (H)    Color, Urine      Light-Yellow, Yellow, Dark-Yellow  Light-Yellow     Appearance, Urine      Clear  Clear     Specific Gravity, Urine      1.005 - 1.035  1.013     pH, Urine      5.0, 5.5, 6.0, 6.5, 7.0, 7.5, 8.0  6.5     Protein, Urine      NEGATIVE, 10 (TRACE), 20 (TRACE) mg/dL NEGATIVE     Glucose, Urine      Normal mg/dL Normal     Blood, Urine      NEGATIVE  NEGATIVE     Ketones, Urine      NEGATIVE mg/dL NEGATIVE     Bilirubin, Urine      NEGATIVE  NEGATIVE     Urobilinogen, Urine      Normal mg/dL Normal     Nitrite, Urine      NEGATIVE  NEGATIVE     Leukocyte Esterase, Urine      NEGATIVE  NEGATIVE     Amphetamine Screen, Urine      Presumptive Negative  Presumptive Negative     Barbiturate Screen, Urine      Presumptive Negative  Presumptive Negative     Benzodiazepines Screen, Urine      Presumptive Negative  Presumptive Negative     Cannabinoid Screen, Urine      Presumptive Negative  Presumptive  Negative     Cocaine Metabolite Screen, Urine      Presumptive Negative  Presumptive Negative     Fentanyl Screen, Urine      Presumptive Negative  Presumptive Negative     Opiate Screen, Urine      Presumptive Negative  Presumptive Negative     Oxycodone Screen, Urine      Presumptive Negative  Presumptive Negative     PCP Screen, Urine      Presumptive Negative  Presumptive Negative     Methadone Screen, Urine      Presumptive Negative  Presumptive Negative     Acetaminophen      10.0 - 30.0 ug/mL <10.0     Salicylate       4 - 20 mg/dL <3     Alcohol      <=10 mg/dL 339 (H)     Protime      9.8 - 12.8 seconds 11.1     INR      0.9 - 1.1  1.0     Lactate      0.4 - 2.0 mmol/L 1.9     Lactate       2.7 (H)     MAGNESIUM      1.60 - 2.40 mg/dL 1.72     BNP      0 - 99 pg/mL 85     Ammonia      16 - 53 umol/L 37     Troponin I, High Sensitivity      0 - 13 ng/L 7     Troponin I, High Sensitivity       7     Extra Tube Hold for add-ons.     Thyroid Stimulating Hormone      0.44 - 3.98 mIU/L  1.35    GGT      5 - 55 U/L  1,049 (H)    FOLATE      >5.0 ng/mL  6.2    Vitamin B12      211 - 911 pg/mL  274       Legend:  (L) Low  (H) High    CTH 1/6 - IMPRESSION:  No acute intracranial hemorrhage was identified.      Findings favoring bilateral subdural collections  right-greater-than-left potentially late subacute or chronic subdural  hematoma with displacement of the bridging veins with minimal  leftward midline shift. Recommend neurosurgical consultation.      Trace fluid left mastoid air cells.     CTH - IMPRESSION:  No interval change. No acute intracranial hemorrhage, intra-axial or extra-axial. 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 Chronic SDH, now with ataxia, unclear if this is the etiology or not  -No additional  traumatic workup needed, stable on rCTH  -Agree with neuro workup, along with EEG  -Will sign off, please call back with any further questions, no need for follow up in clinic    I spent 49 minutes in the professional and overall care of this patient.      Declan Becerra MD  01/07/25  5:10 PM

## 2025-01-07 NOTE — NURSING NOTE
1430- PT/OT yelling for help while working with bed A, upon entering room they state this pt got out of bed turned alarm off and fell, then stood back up before they could get to her. Pts CIWA score now 24, RR nurse and Dr Romo aware and came to bedside, vitals stable, ICU team up to assess as well, pt will transfer to ICU    1500-Unable to reach pts , pts daughter given update on condition and move to ICU

## 2025-01-07 NOTE — H&P
History Of Present Illness  Carmella Frank is a 64 y.o. female sent from PCP d/t behavioral changes, persistent headaches after recent fall and concern for brain bleed. Pt had a mechanical fall a few weeks ago and hit her head on the edge of a metal table. She was seen at [facility] and diagnosed w/concussion. CT head unremarkable at the time. Family states she may have lost consciousness for a few seconds, but was otherwise very disoriented. They report that since her fall, she has been exhibiting some changes including increased forgetfulness, fatigue, persistent headaches. Pt denies falls since then, but reports feeling weaker d/t decreased activity. Pt is currently intoxicated; states she drinks 2-3 glasses of wine daily, stating it helps her headaches. However, she is not a reliable historian. Per family, she does not have known alcohol abuse history; but they are not completely sure of her intake. ED vitals were stable. Labs significant for elevated LFTs, LA 2.7->1.9, alc 339. CT head was repeated and showed No acute intracranial hemorrhage was identified. Findings favoring bilateral subdural collections   right-greater-than-left potentially late subacute or chronic subdural hematoma with displacement of the bridging veins with minimal leftward midline shift. Case discussed w/neurosx by ED, who stated no acute intervention necessary, and outpt f/up is recommended. Pt given dose of ativan in the ED.      Past Medical History  She has no past medical history on file.    Surgical History  She has a past surgical history that includes Other surgical history (10/19/2021).     Social History  Former smoker, quit 1 year ago, reports drinking 2-3 glasses of wine daily, denies illicit drug use    Family History  No family history on file.     Allergies  Patient has no known allergies.    Review of Systems   Constitutional:  Positive for activity change and fatigue.   Neurological:  Positive for weakness.    Psychiatric/Behavioral:  Positive for behavioral problems and confusion.    All other systems reviewed and are negative.       Physical Exam  General Appearance: awake and alert, AAOx3, NAD  HEENT: nc/at, eomi, perrla, moist mucous membranes  Resp: ctab; no wheezing, rhonchi, or rales, normal respiratory effort  Cardio: rrr. S1s2  GI: soft, ntnd, BS+  Ext: no edema, 2+ pulses b/l  Neuro: CN II-XII intact, 5/5 strength in b/l upper and lower extremities, sensation symmetric and intact, b/l ue tremors noted     Last Recorded Vitals  /74 (BP Location: Right arm, Patient Position: Lying)   Pulse 72   Temp 35.9 °C (96.6 °F) (Temporal)   Resp 18   Wt 63.5 kg (140 lb)   SpO2 95%     Relevant Results  Results for orders placed or performed during the hospital encounter of 01/06/25 (from the past 24 hours)   ECG 12 lead   Result Value Ref Range    Ventricular Rate 87 BPM    Atrial Rate 87 BPM    VA Interval 120 ms    QRS Duration 70 ms    QT Interval 380 ms    QTC Calculation(Bazett) 457 ms    P Axis 50 degrees    R Axis 39 degrees    T Axis -3 degrees    QRS Count 15 beats    Q Onset 224 ms    P Onset 164 ms    P Offset 211 ms    T Offset 414 ms    QTC Fredericia 430 ms   Comprehensive metabolic panel   Result Value Ref Range    Glucose 105 (H) 74 - 99 mg/dL    Sodium 141 136 - 145 mmol/L    Potassium 4.0 3.5 - 5.3 mmol/L    Chloride 104 98 - 107 mmol/L    Bicarbonate 26 21 - 32 mmol/L    Anion Gap 15 10 - 20 mmol/L    Urea Nitrogen 9 6 - 23 mg/dL    Creatinine 0.53 0.50 - 1.05 mg/dL    eGFR >90 >60 mL/min/1.73m*2    Calcium 8.6 8.6 - 10.3 mg/dL    Albumin 3.8 3.4 - 5.0 g/dL    Alkaline Phosphatase 111 33 - 136 U/L    Total Protein 7.1 6.4 - 8.2 g/dL     (H) 9 - 39 U/L    Bilirubin, Total 0.6 0.0 - 1.2 mg/dL     (H) 7 - 45 U/L   Lactate   Result Value Ref Range    Lactate 2.7 (H) 0.4 - 2.0 mmol/L   Blood Gas Venous Full Panel   Result Value Ref Range    POCT pH, Venous 7.44 (H) 7.33 - 7.43 pH     POCT pCO2, Venous 39 (L) 41 - 51 mm Hg    POCT pO2, Venous 45 35 - 45 mm Hg    POCT SO2, Venous 72 45 - 75 %    POCT Oxy Hemoglobin, Venous 70.0 45.0 - 75.0 %    POCT Hematocrit Calculated, Venous 49.0 (H) 36.0 - 46.0 %    POCT Sodium, Venous 139 136 - 145 mmol/L    POCT Potassium, Venous 4.4 3.5 - 5.3 mmol/L    POCT Chloride, Venous 104 98 - 107 mmol/L    POCT Ionized Calicum, Venous 0.95 (L) 1.10 - 1.33 mmol/L    POCT Glucose, Venous 106 (H) 74 - 99 mg/dL    POCT Lactate, Venous 3.0 (H) 0.4 - 2.0 mmol/L    POCT Base Excess, Venous 2.3 -2.0 - 3.0 mmol/L    POCT HCO3 Calculated, Venous 26.5 (H) 22.0 - 26.0 mmol/L    POCT Hemoglobin, Venous 16.3 (H) 12.0 - 16.0 g/dL    POCT Anion Gap, Venous 13.0 10.0 - 25.0 mmol/L    Patient Temperature      FiO2 21 %   Protime-INR   Result Value Ref Range    Protime 11.1 9.8 - 12.8 seconds    INR 1.0 0.9 - 1.1   CBC and Auto Differential   Result Value Ref Range    WBC 3.0 (L) 4.4 - 11.3 x10*3/uL    nRBC 0.0 0.0 - 0.0 /100 WBCs    RBC 3.95 (L) 4.00 - 5.20 x10*6/uL    Hemoglobin 13.4 12.0 - 16.0 g/dL    Hematocrit 38.5 36.0 - 46.0 %    MCV 98 80 - 100 fL    MCH 33.9 26.0 - 34.0 pg    MCHC 34.8 32.0 - 36.0 g/dL    RDW 12.7 11.5 - 14.5 %    Platelets 183 150 - 450 x10*3/uL    Neutrophils % 47.6 40.0 - 80.0 %    Immature Granulocytes %, Automated 0.7 0.0 - 0.9 %    Lymphocytes % 34.1 13.0 - 44.0 %    Monocytes % 12.2 2.0 - 10.0 %    Eosinophils % 3.4 0.0 - 6.0 %    Basophils % 2.0 0.0 - 2.0 %    Neutrophils Absolute 1.41 1.20 - 7.70 x10*3/uL    Immature Granulocytes Absolute, Automated 0.02 0.00 - 0.70 x10*3/uL    Lymphocytes Absolute 1.01 (L) 1.20 - 4.80 x10*3/uL    Monocytes Absolute 0.36 0.10 - 1.00 x10*3/uL    Eosinophils Absolute 0.10 0.00 - 0.70 x10*3/uL    Basophils Absolute 0.06 0.00 - 0.10 x10*3/uL   Magnesium   Result Value Ref Range    Magnesium 1.72 1.60 - 2.40 mg/dL   B-Type Natriuretic Peptide   Result Value Ref Range    BNP 85 0 - 99 pg/mL   Acute Toxicology Panel, Blood    Result Value Ref Range    Acetaminophen <10.0 10.0 - 30.0 ug/mL    Salicylate  <3 4 - 20 mg/dL    Alcohol 339 (H) <=10 mg/dL   Ammonia   Result Value Ref Range    Ammonia 37 16 - 53 umol/L   Troponin I, High Sensitivity, Initial   Result Value Ref Range    Troponin I, High Sensitivity 7 0 - 13 ng/L   Influenza A, and B PCR   Result Value Ref Range    Flu A Result Not Detected Not Detected    Flu B Result Not Detected Not Detected   Sars-CoV-2 PCR   Result Value Ref Range    Coronavirus 2019, PCR Not Detected Not Detected   RSV PCR   Result Value Ref Range    RSV PCR Not Detected Not Detected   Drug Screen, Urine   Result Value Ref Range    Amphetamine Screen, Urine Presumptive Negative Presumptive Negative    Barbiturate Screen, Urine Presumptive Negative Presumptive Negative    Benzodiazepines Screen, Urine Presumptive Negative Presumptive Negative    Cannabinoid Screen, Urine Presumptive Negative Presumptive Negative    Cocaine Metabolite Screen, Urine Presumptive Negative Presumptive Negative    Fentanyl Screen, Urine Presumptive Negative Presumptive Negative    Opiate Screen, Urine Presumptive Negative Presumptive Negative    Oxycodone Screen, Urine Presumptive Negative Presumptive Negative    PCP Screen, Urine Presumptive Negative Presumptive Negative    Methadone Screen, Urine Presumptive Negative Presumptive Negative   Urinalysis with Reflex Culture and Microscopic   Result Value Ref Range    Color, Urine Light-Yellow Light-Yellow, Yellow, Dark-Yellow    Appearance, Urine Clear Clear    Specific Gravity, Urine 1.013 1.005 - 1.035    pH, Urine 6.5 5.0, 5.5, 6.0, 6.5, 7.0, 7.5, 8.0    Protein, Urine NEGATIVE NEGATIVE, 10 (TRACE), 20 (TRACE) mg/dL    Glucose, Urine Normal Normal mg/dL    Blood, Urine NEGATIVE NEGATIVE    Ketones, Urine NEGATIVE NEGATIVE mg/dL    Bilirubin, Urine NEGATIVE NEGATIVE    Urobilinogen, Urine Normal Normal mg/dL    Nitrite, Urine NEGATIVE NEGATIVE    Leukocyte Esterase, Urine  NEGATIVE NEGATIVE   Troponin, High Sensitivity, 1 Hour   Result Value Ref Range    Troponin I, High Sensitivity 7 0 - 13 ng/L   Blood Gas Lactic Acid, Venous   Result Value Ref Range    POCT Lactate, Venous 2.6 (H) 0.4 - 2.0 mmol/L   Lactate   Result Value Ref Range    Lactate 1.9 0.4 - 2.0 mmol/L     CT head wo IV contrast    Result Date: 1/6/2025  Interpreted By:  Bigg Terrazas, STUDY: CT HEAD WO IV CONTRAST;  1/6/2025 6:00 pm   INDICATION: Trauma. Signs/Symptoms:acting bizarrely, no focal deficits, head injury/fall on xmas zeeshan.     COMPARISON: None.   ACCESSION NUMBER(S): [id number]   ORDERING CLINICIAN: AURELIO OLMSTEAD   TECHNIQUE: Axial noncontrast head CT   FINDINGS: The ventricles, sulci and basal cisterns are mildly prominent compatible with atrophy. There is a mildly heterogeneous process right subdural space estimated about 6 mm thickness coronal image 205/58 with minimal internal heterogeneity without hyperdense component with crescentic appearance favoring nonacute subdural process such as a late subacute or chronic subdural hematoma with displacement of the cortical veins. Suggestion of a similar smaller process on the left estimated at about 2-3 mm thickness coronal image 205/64. There is minimal apparent mass-effect with about 1-2 mm leftward midline shift.   Calvarium: No fracture   Paranasal sinuses and mastoids: Trace fluid within the left mastoid air cells. The visualized portions of the paranasal sinuses are clear.   Brain Injury (BIG) guidelines CT values:   Skull fracture: No SDH (subdural hematoma): None detected EDH (epidural hemtoma): None detected IPH (intraparenchymal hemorrhage): None detected SAH (subarachnoid hemorrhage): None detected IVH (intraventricular hemorrhage): No   Reference: Julio JORDAN, Rolando RS, Suleman M, et al. The BIG (brain injury guidelines) project: defining the management of traumatic brain injury by acute care surgeons. J Trauma Acute Care Surg. 2014;76:959e542.        No acute intracranial hemorrhage was identified.   Findings favoring bilateral subdural collections right-greater-than-left potentially late subacute or chronic subdural hematoma with displacement of the bridging veins with minimal leftward midline shift. Recommend neurosurgical consultation.   Trace fluid left mastoid air cells.   MACRO: Bigg Terrazas discussed the significance and urgency of this critical finding by secure chat with  AURELIO OLMSTEAD on 1/6/2025 at 6:44 pm.  (**-RCF-**) Findings:  See findings.     Signed by: Bigg Terrazas 1/6/2025 6:44 PM Dictation workstation:   [id number]    ECG 12 lead    Result Date: 1/6/2025  Normal sinus rhythm with sinus arrhythmia Nonspecific ST and T wave abnormality Abnormal ECG When compared with ECG of 01-NOV-2019 08:59, T wave inversion now evident in Inferior leads Nonspecific T wave abnormality, worse in Anterior leads QT has lengthened   Scheduled medications  famotidine, 20 mg, oral, BID   Or  famotidine, 20 mg, intravenous, BID  folic acid, 1 mg, oral, Daily  melatonin, 5 mg, oral, Nightly  multivitamin with minerals, 1 tablet, oral, Daily  thiamine, 100 mg, oral, Daily      Continuous medications  sodium chloride 0.9%, 75 mL/hr      PRN medications  PRN medications: acetaminophen **OR** acetaminophen **OR** acetaminophen, acetaminophen **OR** acetaminophen **OR** acetaminophen, LORazepam **OR** LORazepam **OR** LORazepam, ondansetron **OR** ondansetron               Assessment/Plan   Assessment & Plan    63 y/o F presenting w/behavioral changes and persistent headache after a fall w/head injury 2 weeks ago. Pt also intoxicated w/unknown h/o alcohol consumption.     Metabolic encephalopathy, unclear etiology  Recent fall w/head injury  CT imaging w/small subdural collections, subacute/chronic in nature  Acute alcohol intoxication w/impending withdrawal  Physical deconditioning    Admit to obs  Per family, pt's sx, including forgetfulness, fatigue,  behavioral changes started acutely after her fall  Per ED note, CT findings of possible subacute/chronic hematomas are likely hygromas per neurosx and outpt f/up is recommended  Will obtain MRI in the am  Additional labs ordered for am, including LFTs, b12, folate, TFTs  [facility] protocol initiated  PT/OT       James Hanna MD

## 2025-01-07 NOTE — ED PROVIDER NOTES
HPI   Chief Complaint   Patient presents with    Fall     Pt had fall on [address], was seen at [facility], they did a scan and ruled she had a concussion. Since the fall, pt family member states she is extremely forgetful, sleeping a lot, and not acting normal. PCP sent her here due to thinking she has a slow bleed.        This is a 64-year-old female presenting for evaluation of altered mentation.  Patient had mechanical fall on [address] in which she tripped over presents and fell backwards hitting her head on a metal coffee table.  No LOC but was very disoriented around the fall.  Was seen at [facility] and had negative head CT and was discharged diagnosed with concussion.  Since then has been acting bizarrely, forgetful, demonstrating short-term memory loss.  For example had meal at the table with  and daughter and then noted to the  that she has not seen her daughter recently.  Sees cats and other things that are not present.   noted that he finds partially drank bottles of wine around the home.  When questioned the patient states that she has been drinking at times because of her headache.  The  states he has never known her to drink heavily or more than socially.  There has been no interval fall or trauma.  Patient reports persistent headache and has insight and notes that she has been forgetful at times and cannot account for this behavior.      History provided by:  Patient, spouse and relative   used: No            Patient History   History reviewed. No pertinent past medical history.  Past Surgical History:   Procedure Laterality Date    OTHER SURGICAL HISTORY  10/19/2021    No history of surgery     No family history on file.  Social History     Tobacco Use    Smoking status: Not on file    Smokeless tobacco: Not on file   Substance Use Topics    Alcohol use: Not on file    Drug use: Not on file       Physical Exam   ED Triage Vitals [01/06/25 1650]    Temperature Heart Rate Respirations BP   35.9 °C (96.6 °F) 91 18 115/74      Pulse Ox Temp Source Heart Rate Source Patient Position   (!) 92 % Temporal Monitor Sitting      BP Location FiO2 (%)     Left arm --       Physical Exam  Constitutional:       Appearance: Normal appearance.   HENT:      Head: Normocephalic and atraumatic.      Mouth/Throat:      Mouth: Mucous membranes are moist.      Pharynx: Oropharynx is clear.   Eyes:      General: No scleral icterus.        Right eye: No discharge.         Left eye: No discharge.      Extraocular Movements: Extraocular movements intact.      Pupils: Pupils are equal, round, and reactive to light.   Neck:      Vascular: No carotid bruit.   Cardiovascular:      Rate and Rhythm: Normal rate and regular rhythm.      Pulses: Normal pulses.      Heart sounds: Normal heart sounds.   Pulmonary:      Effort: Pulmonary effort is normal.      Breath sounds: Normal breath sounds.   Abdominal:      Palpations: Abdomen is soft.      Tenderness: There is no abdominal tenderness. There is no guarding or rebound.   Musculoskeletal:      Cervical back: Normal range of motion and neck supple. No rigidity or tenderness.   Skin:     General: Skin is warm and dry.   Neurological:      General: No focal deficit present.      Mental Status: She is alert and oriented to person, place, and time.           ED Course & MDM   Diagnoses as of 01/06/25 2132   Altered mental status, unspecified altered mental status type   Alcoholic intoxication with complication (CMS-HCC)   Abnormal CT of brain                 No data recorded     [place] Coma Scale Score: 15 (01/06/25 2004 : Peggy Gomez RN)       NIH Stroke Scale: 0 (01/06/25 1711 : Ravinder Meier PA-C)                   Medical Decision Making  DDx: Postconcussive syndrome, ICH, electrolyte derangement, intoxication, metabolic derangement    Patient is stable hemodynamically.  Currently calm, cooperative, mentating appropriately.  No focal  deficits.  Was given a liter of IV normal saline, 4 mg IV Zofran.  The patient's workup is positive for a lactic acidosis of 2.6, ethanol noted to be 339, no hypercarbia, leukopenia noted which was present on prior labs sometime ago, stable H&H, the remainder of laboratory evaluation is reassuring.  Patient refused chest x-ray but a CT of the head demonstrated no acute intracranial hemorrhage but showed bilateral subdural collections right greater than left potentially a subacute or chronic subdural hematoma with displacement of the bridging veins with minimal leftward midline shift.  I discussed the case with Dr. Menjivar neurosurgery who feels this to be more hydroma as opposed to bleed however does not feel there is any acute process and there is no indication for repeat head CT or transfer for neurosurgery evaluation, feels the patient can follow-up with neurosurgery as an outpatient.  I suspect lactic acidosis secondary to alcohol.  She is actively intoxicated and is not demonstrating any signs of withdrawal.  Despite alcohol intoxication the patient's presentation is concerning given the abrupt onset of her altered mentation and bizarre behavior after the fall and the alcohol intoxication appears to be new according to the family so I feel she would benefit from hospitalization, MRI possibly neurology consult.  I discussed the case with the hospitalist who accepted the patient.  This visit was staffed with the attending physician Dr. Ibrahim.      Disclaimer: This note was dictated using speech recognition software. An attempt at proofreading was made to minimize errors. Minor errors in transcription may be present. Please call if questions.    Amount and/or Complexity of Data Reviewed  Labs: ordered.  Radiology: ordered.  ECG/medicine tests: ordered and independent interpretation performed.     Details: EKG interpreted by me: Normal sinus rhythm with sinus arrhythmia.  Rate 87.  QTc 457.  Normal axis.   Nonspecific ST wave abnormality.  No STEMI.        Procedure  Procedures     Ravinder R Wire, PA-C  01/06/25 6065

## 2025-01-07 NOTE — CONSULTS
History Of Present Illness  Carmella Frank is a 64 y.o. female presenting with confusion unsteady gait and frequent falls    Patient is a 64-year-old lady who had a fall on [address] zeeshan when she tripped over a box.  She did not go to the hospital and seek help next day and went to [facility] emergency room where she had a CAT scan and workup and was discharged home    According to the daughter patient has been drinking a lot and she had a fall as yesterday and was acting strange.  She came to the emergency room and was found to have an alcohol level of 339    At the time of my evaluation she is alert awake but pleasantly confused.  She does answer simple question and she was transferred because her CAT scan that showed bilateral subdural hygroma without any acute bleed    Please refer to the initial H&P and the ER records for details    Patient lives with her  and has daughter who  42 years ago from committing suicide and has not 40-year-old son who also has a history of drinking problem    At present she is retired.    Past Medical History  History reviewed. No pertinent past medical history.    Surgical History  Past Surgical History:   Procedure Laterality Date    OTHER SURGICAL HISTORY  10/19/2021    No history of surgery       Social History  Social History     Tobacco Use    Smoking status: Never    Smokeless tobacco: Never   Vaping Use    Vaping status: Never Used       Allergies  Patient has no known allergies.    No medications prior to admission.       Review of Systems   Constitutional:  Negative for fatigue, fever and unexpected weight change.   HENT:  Negative for dental problem, ear pain, hearing loss, sinus pressure, tinnitus and trouble swallowing.    Eyes:  Negative for photophobia, pain and visual disturbance.   Respiratory:  Negative for cough, shortness of breath and wheezing.    Cardiovascular:  Negative for chest pain, palpitations and leg swelling.   Gastrointestinal:  Negative for  abdominal pain, nausea and vomiting.   Genitourinary:  Negative for difficulty urinating, enuresis and frequency.   Musculoskeletal:  Positive for gait problem. Negative for arthralgias, back pain, joint swelling, neck pain and neck stiffness.   Skin:  Negative for pallor and rash.   Allergic/Immunologic: Negative for food allergies.   Neurological:  Positive for tremors and weakness. Negative for dizziness, seizures, syncope, facial asymmetry, speech difficulty, light-headedness, numbness and headaches.   Hematological:  Negative for adenopathy. Does not bruise/bleed easily.   Psychiatric/Behavioral:  Positive for agitation, behavioral problems, confusion and sleep disturbance. Negative for hallucinations. The patient is nervous/anxious. The patient is not hyperactive.        Physical Exam  Patient is alert awake but little restless.  HEENT exams pupils to be equal and reactive and she was wearing glasses.  Ear nose and throat was unremarkable    Neck was supple I could not hear any carotid bruits    Cardiovascular examination revealed normal S1-S2 with clear breath sounds bilaterally    Abdomen was soft nondistended with no organomegaly.    Extremities adhansia review body rash and had some arthritic deformity.    Neurological Exam  Patient was alert awake oriented to herself but was pleasantly confused and knew she was in the hospital but did not know the date month.  She has 3 kids and she was telling me she has 5 children.  No active hallucinations or delusions.  Rest of the higher functions were deferred.    Cranial revealed normal extraocular movement with face being symmetrical tongue in the midline with normal sensations she was able to elevate the palate and shoulder without difficulty    Hearing was normal    Motor exam is normal to slightly increased tone with tremulousness but she was able to move all 4 EXTR    Rest of the neuroexam was deferred      Last Recorded Vitals  Blood pressure 140/77, pulse  "66, temperature 36.2 °C (97.2 °F), temperature source Temporal, resp. rate 19, height 1.651 m (5' 5\"), weight 65.3 kg (143 lb 15.4 oz), SpO2 95%.    Relevant Results  Recent Results (from the past 24 hours)   Drug Screen, Urine    Collection Time: 01/06/25  7:31 PM   Result Value Ref Range    Amphetamine Screen, Urine Presumptive Negative Presumptive Negative    Barbiturate Screen, Urine Presumptive Negative Presumptive Negative    Benzodiazepines Screen, Urine Presumptive Negative Presumptive Negative    Cannabinoid Screen, Urine Presumptive Negative Presumptive Negative    Cocaine Metabolite Screen, Urine Presumptive Negative Presumptive Negative    Fentanyl Screen, Urine Presumptive Negative Presumptive Negative    Opiate Screen, Urine Presumptive Negative Presumptive Negative    Oxycodone Screen, Urine Presumptive Negative Presumptive Negative    PCP Screen, Urine Presumptive Negative Presumptive Negative    Methadone Screen, Urine Presumptive Negative Presumptive Negative   Urinalysis with Reflex Culture and Microscopic    Collection Time: 01/06/25  7:31 PM   Result Value Ref Range    Color, Urine Light-Yellow Light-Yellow, Yellow, Dark-Yellow    Appearance, Urine Clear Clear    Specific Gravity, Urine 1.013 1.005 - 1.035    pH, Urine 6.5 5.0, 5.5, 6.0, 6.5, 7.0, 7.5, 8.0    Protein, Urine NEGATIVE NEGATIVE, 10 (TRACE), 20 (TRACE) mg/dL    Glucose, Urine Normal Normal mg/dL    Blood, Urine NEGATIVE NEGATIVE    Ketones, Urine NEGATIVE NEGATIVE mg/dL    Bilirubin, Urine NEGATIVE NEGATIVE    Urobilinogen, Urine Normal Normal mg/dL    Nitrite, Urine NEGATIVE NEGATIVE    Leukocyte Esterase, Urine NEGATIVE NEGATIVE   Extra Urine Gray Tube    Collection Time: 01/06/25  7:31 PM   Result Value Ref Range    Extra Tube Hold for add-ons.    Troponin, High Sensitivity, 1 Hour    Collection Time: 01/06/25  7:37 PM   Result Value Ref Range    Troponin I, High Sensitivity 7 0 - 13 ng/L   Blood Gas Lactic Acid, Venous    " Collection Time: 01/06/25  7:37 PM   Result Value Ref Range    POCT Lactate, Venous 2.6 (H) 0.4 - 2.0 mmol/L   Lactate    Collection Time: 01/06/25  7:37 PM   Result Value Ref Range    Lactate 1.9 0.4 - 2.0 mmol/L   CBC    Collection Time: 01/07/25  6:53 AM   Result Value Ref Range    WBC 3.1 (L) 4.4 - 11.3 x10*3/uL    nRBC 0.0 0.0 - 0.0 /100 WBCs    RBC 3.49 (L) 4.00 - 5.20 x10*6/uL    Hemoglobin 11.9 (L) 12.0 - 16.0 g/dL    Hematocrit 34.8 (L) 36.0 - 46.0 %     80 - 100 fL    MCH 34.1 (H) 26.0 - 34.0 pg    MCHC 34.2 32.0 - 36.0 g/dL    RDW 13.0 11.5 - 14.5 %    Platelets 156 150 - 450 x10*3/uL   Comprehensive metabolic panel    Collection Time: 01/07/25  6:53 AM   Result Value Ref Range    Glucose 110 (H) 74 - 99 mg/dL    Sodium 139 136 - 145 mmol/L    Potassium 4.2 3.5 - 5.3 mmol/L    Chloride 102 98 - 107 mmol/L    Bicarbonate 26 21 - 32 mmol/L    Anion Gap 15 10 - 20 mmol/L    Urea Nitrogen 11 6 - 23 mg/dL    Creatinine 0.64 0.50 - 1.05 mg/dL    eGFR >90 >60 mL/min/1.73m*2    Calcium 8.5 (L) 8.6 - 10.3 mg/dL    Albumin 3.9 3.4 - 5.0 g/dL    Alkaline Phosphatase 88 33 - 136 U/L    Total Protein 6.3 (L) 6.4 - 8.2 g/dL     (H) 9 - 39 U/L    Bilirubin, Total 1.0 0.0 - 1.2 mg/dL    ALT 88 (H) 7 - 45 U/L   TSH with reflex to Free T4 if abnormal    Collection Time: 01/07/25  6:53 AM   Result Value Ref Range    Thyroid Stimulating Hormone 1.35 0.44 - 3.98 mIU/L   Gamma-Glutamyl Transferase    Collection Time: 01/07/25  6:53 AM   Result Value Ref Range    GGT 1,049 (H) 5 - 55 U/L   Folate    Collection Time: 01/07/25  6:53 AM   Result Value Ref Range    Folate, Serum 6.2 >5.0 ng/mL   Vitamin B12    Collection Time: 01/07/25  6:53 AM   Result Value Ref Range    Vitamin B12 274 211 - 911 pg/mL           NIH Stroke Scale  1A. Level of Consciousness: Alert, Keenly Responsive  1B. Ask Month and Age: Both Questions Right  1C. Blink Eyes & Squeeze Hands: Performs Both Tasks  2. Best Gaze: Normal  3. Visual: No  Visual Loss  4. Facial Palsy: Normal Symmetrical Movements  5A. Motor - Left Arm: No Drift  5B. Motor - Right Arm: No Drift  6A. Motor - Left Leg: No Drift  6B. Motor - Right Leg: No Drift  7. Limb Ataxia: Absent  8. Sensory Loss: Normal  9. Best Language: No Aphasia  10. Dysarthria: Normal  11. Extinction and Inattention: No Abnormality  NIH Stroke Scale: 0           Rell Coma Scale  Best Eye Response: Spontaneous  Best Verbal Response: Confused  Best Motor Response: Follows commands  Rell Coma Scale Score: 14                 CT head wo IV contrast    Result Date: 1/7/2025  Interpreted By:  Nav Brizuela, STUDY: CT HEAD WO IV CONTRAST;  1/7/2025 11:33 am   INDICATION: Signs/Symptoms:to see progression.     COMPARISON: CT Brain,   ACCESSION NUMBER(S): [id number]   ORDERING CLINICIAN: PAUL CORRALES   TECHNIQUE: CT of the brain from the skull vertex to the skull base, without intravenous contrast   FINDINGS: ACUTE INTRA-AXIAL HEMORRHAGE:  Negative   ACUTE EXTRA-AXIAL/SUBDURAL HEMORRHAGE:  Nonacute, only the probable old right greater than left subdural hygromas without interval change from one day prior   ACUTE INTRACRANIAL MASS EFFECT:  Negative   CT EVIDENCE OF ACUTE / SUBACUTE TERRITORIAL ISCHEMIA:  Negative   VENTRICLES:  Normal caliber and configuration   OTHER BRAIN FINDINGS:  No additional findings to note   INCLUDED PARANASAL SINUSES: All clear   INCLUDED MASTOID AIR CELLS: All clear   SKULL:  No lytic or blastic lesion   EXTRACRANIAL SOFT TISSUES:  Scalp and occular globes grossly normal by CT       No interval change. No acute intracranial hemorrhage, intra-axial or extra-axial. Old right slightly larger than left bilateral subdural hygromas without acute underlying mass effect is all unchanged   MACRO: None   Signed by: Nav Brizuela 1/7/2025 12:06 PM Dictation workstation:   [id number]    CT head wo IV contrast    Result Date: 1/6/2025  Interpreted By:  Bigg Terrazas, STUDY: CT HEAD WO IV CONTRAST;   1/6/2025 6:00 pm   INDICATION: Trauma. Signs/Symptoms:acting bizarrely, no focal deficits, head injury/fall on xmas zeeshan.     COMPARISON: None.   ACCESSION NUMBER(S): [id number]   ORDERING CLINICIAN: AURELIO OLMSTEAD   TECHNIQUE: Axial noncontrast head CT   FINDINGS: The ventricles, sulci and basal cisterns are mildly prominent compatible with atrophy. There is a mildly heterogeneous process right subdural space estimated about 6 mm thickness coronal image 205/58 with minimal internal heterogeneity without hyperdense component with crescentic appearance favoring nonacute subdural process such as a late subacute or chronic subdural hematoma with displacement of the cortical veins. Suggestion of a similar smaller process on the left estimated at about 2-3 mm thickness coronal image 205/64. There is minimal apparent mass-effect with about 1-2 mm leftward midline shift.   Calvarium: No fracture   Paranasal sinuses and mastoids: Trace fluid within the left mastoid air cells. The visualized portions of the paranasal sinuses are clear.   Brain Injury (BIG) guidelines CT values:   Skull fracture: No SDH (subdural hematoma): None detected EDH (epidural hemtoma): None detected IPH (intraparenchymal hemorrhage): None detected SAH (subarachnoid hemorrhage): None detected IVH (intraventricular hemorrhage): No   Reference: Julio B, Rolando RS, Suleman M, et al. The BIG (brain injury guidelines) project: defining the management of traumatic brain injury by acute care surgeons. J Trauma Acute Care Surg. 2014;76:561v195.       No acute intracranial hemorrhage was identified.   Findings favoring bilateral subdural collections right-greater-than-left potentially late subacute or chronic subdural hematoma with displacement of the bridging veins with minimal leftward midline shift. Recommend neurosurgical consultation.   Trace fluid left mastoid air cells.   MACRO: Bigg Terrazas discussed the significance and urgency of this critical  finding by secure chat with  AURELIO OLMSTEAD on 1/6/2025 at 6:44 pm.  (**-RCF-**) Findings:  See findings.     Signed by: Bigg Terrazas 1/6/2025 6:44 PM Dictation workstation:   [id number]    ECG 12 lead    Result Date: 1/6/2025  Normal sinus rhythm with sinus arrhythmia Nonspecific ST and T wave abnormality Abnormal ECG When compared with ECG of 01-NOV-2019 08:59, T wave inversion now evident in Inferior leads Nonspecific T wave abnormality, worse in Anterior leads QT has lengthened     I have personally reviewed the following imaging results CT head wo IV contrast    Result Date: 1/7/2025  Interpreted By:  Nav Brizuela, STUDY: CT HEAD WO IV CONTRAST;  1/7/2025 11:33 am   INDICATION: Signs/Symptoms:to see progression.     COMPARISON: CT Brain,   ACCESSION NUMBER(S): [id number]   ORDERING CLINICIAN: PAUL CORRALES   TECHNIQUE: CT of the brain from the skull vertex to the skull base, without intravenous contrast   FINDINGS: ACUTE INTRA-AXIAL HEMORRHAGE:  Negative   ACUTE EXTRA-AXIAL/SUBDURAL HEMORRHAGE:  Nonacute, only the probable old right greater than left subdural hygromas without interval change from one day prior   ACUTE INTRACRANIAL MASS EFFECT:  Negative   CT EVIDENCE OF ACUTE / SUBACUTE TERRITORIAL ISCHEMIA:  Negative   VENTRICLES:  Normal caliber and configuration   OTHER BRAIN FINDINGS:  No additional findings to note   INCLUDED PARANASAL SINUSES: All clear   INCLUDED MASTOID AIR CELLS: All clear   SKULL:  No lytic or blastic lesion   EXTRACRANIAL SOFT TISSUES:  Scalp and occular globes grossly normal by CT       No interval change. No acute intracranial hemorrhage, intra-axial or extra-axial. Old right slightly larger than left bilateral subdural hygromas without acute underlying mass effect is all unchanged   MACRO: None   Signed by: Nav Brizuela 1/7/2025 12:06 PM Dictation workstation:   [id number]    CT head wo IV contrast    Result Date: 1/6/2025  Interpreted By:  Bigg Terrazas, STUDY: CT HEAD WO  IV CONTRAST;  1/6/2025 6:00 pm   INDICATION: Trauma. Signs/Symptoms:acting bizarrely, no focal deficits, head injury/fall on xmas zeeshan.     COMPARISON: None.   ACCESSION NUMBER(S): [id number]   ORDERING CLINICIAN: AURELIO OLMSTEAD   TECHNIQUE: Axial noncontrast head CT   FINDINGS: The ventricles, sulci and basal cisterns are mildly prominent compatible with atrophy. There is a mildly heterogeneous process right subdural space estimated about 6 mm thickness coronal image 205/58 with minimal internal heterogeneity without hyperdense component with crescentic appearance favoring nonacute subdural process such as a late subacute or chronic subdural hematoma with displacement of the cortical veins. Suggestion of a similar smaller process on the left estimated at about 2-3 mm thickness coronal image 205/64. There is minimal apparent mass-effect with about 1-2 mm leftward midline shift.   Calvarium: No fracture   Paranasal sinuses and mastoids: Trace fluid within the left mastoid air cells. The visualized portions of the paranasal sinuses are clear.   Brain Injury (BIG) guidelines CT values:   Skull fracture: No SDH (subdural hematoma): None detected EDH (epidural hemtoma): None detected IPH (intraparenchymal hemorrhage): None detected SAH (subarachnoid hemorrhage): None detected IVH (intraventricular hemorrhage): No   Reference: Julio B, Rolando RS, Suleman M, et al. The BIG (brain injury guidelines) project: defining the management of traumatic brain injury by acute care surgeons. J Trauma Acute Care Surg. 2014;76:003u020.       No acute intracranial hemorrhage was identified.   Findings favoring bilateral subdural collections right-greater-than-left potentially late subacute or chronic subdural hematoma with displacement of the bridging veins with minimal leftward midline shift. Recommend neurosurgical consultation.   Trace fluid left mastoid air cells.   MACRO: Bigg Terrazas discussed the significance and urgency of this  "critical finding by secure chat with  AURELIO WIRE on 1/6/2025 at 6:44 pm.  (**-RCF-**) Findings:  See findings.     Signed by: Bigg Terrazas 1/6/2025 6:44 PM Dictation workstation:   [id number]    ECG 12 lead    Result Date: 1/6/2025  Normal sinus rhythm with sinus arrhythmia Nonspecific ST and T wave abnormality Abnormal ECG When compared with ECG of 01-NOV-2019 08:59, T wave inversion now evident in Inferior leads Nonspecific T wave abnormality, worse in Anterior leads QT has lengthened    CT cervical spine wo IV contrast    Result Date: 12/25/2024  EXAMINATION: CT OF THE HEAD WITHOUT CONTRAST; CT OF THE CERVICAL SPINE WITHOUT CONTRAST 12/25/2024 3:14 pm TECHNIQUE: CT of the head was performed without the administration of intravenous contrast. Automated exposure control, iterative reconstruction, and/or weight based adjustment of the mA/kV was utilized to reduce the radiation dose to as low as reasonably achievable.; CT of the cervical spine was performed without the administration of intravenous contrast. Multiplanar reformatted images  are provided for review. Automated exposure control, iterative reconstruction, and/or weight based adjustment of the mA/kV was utilized to reduce the radiation dose to as low as reasonably achievable. COMPARISON: None. HISTORY: ORDERING SYSTEM PROVIDED HISTORY: Head injury LOC TECHNOLOGIST PROVIDED HISTORY: Reason for exam:->Head injury LOC Has a \"code stroke\" or \"stroke alert\" been called?->No Decision Support Exception - unselect if not a suspected or confirmed emergency medical condition->Emergency Medical Condition (MA) What reading provider will be dictating this exam?->CRC FINDINGS: BRAIN/VENTRICLES: There is no acute intracranial hemorrhage, mass effect or midline shift.  No abnormal extra-axial fluid collection.  The gray-white differentiation is maintained without evidence of an acute infarct.  There is no evidence of hydrocephalus. There is mild cerebral atrophy " "with mild periventricular white matter changes. ORBITS: The visualized portion of the orbits demonstrate no acute abnormality. SINUSES: The visualized paranasal sinuses and mastoid air cells demonstrate no acute abnormality. SOFT TISSUES/SKULL:  No acute abnormality of the visualized skull or soft tissues. CERVICAL SPINE BONES/ALIGNMENT: There is no acute fracture or traumatic malalignment. DEGENERATIVE CHANGES: There is mild discogenic change at the C5-6 level without evidence of acute fracture or subluxation. SOFT TISSUES: There is no prevertebral soft tissue swelling.    No acute intracranial abnormality. No acute fracture in the cervical spine.    CT head wo IV contrast    Result Date: 12/25/2024  EXAMINATION: CT OF THE HEAD WITHOUT CONTRAST; CT OF THE CERVICAL SPINE WITHOUT CONTRAST 12/25/2024 3:14 pm TECHNIQUE: CT of the head was performed without the administration of intravenous contrast. Automated exposure control, iterative reconstruction, and/or weight based adjustment of the mA/kV was utilized to reduce the radiation dose to as low as reasonably achievable.; CT of the cervical spine was performed without the administration of intravenous contrast. Multiplanar reformatted images  are provided for review. Automated exposure control, iterative reconstruction, and/or weight based adjustment of the mA/kV was utilized to reduce the radiation dose to as low as reasonably achievable. COMPARISON: None. HISTORY: ORDERING SYSTEM PROVIDED HISTORY: Head injury LOC TECHNOLOGIST PROVIDED HISTORY: Reason for exam:->Head injury LOC Has a \"code stroke\" or \"stroke alert\" been called?->No Decision Support Exception - unselect if not a suspected or confirmed emergency medical condition->Emergency Medical Condition (MA) What reading provider will be dictating this exam?->CRC FINDINGS: BRAIN/VENTRICLES: There is no acute intracranial hemorrhage, mass effect or midline shift.  No abnormal extra-axial fluid collection.  The " gray-white differentiation is maintained without evidence of an acute infarct.  There is no evidence of hydrocephalus. There is mild cerebral atrophy with mild periventricular white matter changes. ORBITS: The visualized portion of the orbits demonstrate no acute abnormality. SINUSES: The visualized paranasal sinuses and mastoid air cells demonstrate no acute abnormality. SOFT TISSUES/SKULL:  No acute abnormality of the visualized skull or soft tissues. CERVICAL SPINE BONES/ALIGNMENT: There is no acute fracture or traumatic malalignment. DEGENERATIVE CHANGES: There is mild discogenic change at the C5-6 level without evidence of acute fracture or subluxation. SOFT TISSUES: There is no prevertebral soft tissue swelling.    No acute intracranial abnormality. No acute fracture in the cervical spine.  .      Assessment/Plan     Acute change in mental status most likely alcohol intoxication.  Chronic alcohol abuse  History of depression  Bilateral subdural hygroma  Delirium tremens  Plan.  Continue with current protocol and treat chronic alcoholism and probably alcohol withdrawal symptoms.  Would like to get an MRI of the head to look for any subdural hematoma.  EEG was done which showed alpha and beta activity without any spike-wave discharges    Consult social service for discharge planning with was given acute rehab for chronic alcoholism.  I had a long discussion with patient daughter that patient will need to see a psychologist and maybe a psychiatrist for underlying depression and get proper treatment    Will follow with you.    Due to technical limitations of voice recognition and human error, this note may not accurately reflect the care of the patient.                 Elijah Joyce MD

## 2025-01-07 NOTE — PROGRESS NOTES
"Carmella Frank is a 64 y.o. female on day 0 of admission presenting with alcohol abuse, falls, subdural hematoma       Subjective   Still very unsteady, and shakey, required ativan this AM.     Objective     Last Recorded Vitals  /71   Pulse 71   Temp 36.9 °C (98.4 °F)   Resp 16   Ht 1.651 m (5' 5\")   Wt 63.5 kg (140 lb)   SpO2 93%   BMI 23.30 kg/m²      Intake/Output last 3 Shifts:  No intake or output data in the 24 hours ending 01/07/25 1309    Scheduled medications  famotidine, 20 mg, oral, BID   Or  famotidine, 20 mg, intravenous, BID  folic acid, 1 mg, oral, Daily  melatonin, 5 mg, oral, Nightly  multivitamin with minerals, 1 tablet, oral, Daily  thiamine, 100 mg, oral, Daily      Continuous medications  sodium chloride 0.9%, 75 mL/hr, Last Rate: 75 mL/hr (01/07/25 0314)      PRN medications  PRN medications: acetaminophen **OR** acetaminophen **OR** acetaminophen, acetaminophen **OR** acetaminophen **OR** acetaminophen, LORazepam **OR** LORazepam **OR** LORazepam, ondansetron **OR** ondansetron    Physical Exam   Gen: shakey   HEENT: EOM, MMM  CV: RRR, no murmurs rubs or gallops  Resp: coarse rhonchi b/l   Abdomen: soft, NT,+BS  LE: No edema      Relevant Results  Lab Results   Component Value Date    WBC 3.1 (L) 01/07/2025    HGB 11.9 (L) 01/07/2025    HCT 34.8 (L) 01/07/2025     01/07/2025     01/07/2025     Lab Results   Component Value Date    GLUCOSE 110 (H) 01/07/2025    CALCIUM 8.5 (L) 01/07/2025     01/07/2025    K 4.2 01/07/2025    CO2 26 01/07/2025     01/07/2025    BUN 11 01/07/2025    CREATININE 0.64 01/07/2025         Assessment/Plan   64 year old female admitted with alcohol intoxication and frequent falls and found to have small subdural bleed    -ED discussed with neurosurgery and can follow up outpatient  -has fallen few times in past few weeks  -PT/OT  -repeat CT head did not show progression,. MRI ordered  -neurology consulted here    Hx of alcohol " abuse and withdrawal: continue [facility] protocol  -IV fluids, thiamine, multivitamin    DVT ppx: SCD       Addendum: patient very agitated on arrival to the floor [facility] 24. Plan to transfer to ICU for further care.     Juanpablo Romo MD

## 2025-01-08 ENCOUNTER — APPOINTMENT (OUTPATIENT)
Dept: RADIOLOGY | Facility: HOSPITAL | Age: 65
End: 2025-01-08
Payer: COMMERCIAL

## 2025-01-08 LAB
ALBUMIN SERPL BCP-MCNC: 3.6 G/DL (ref 3.4–5)
ALP SERPL-CCNC: 87 U/L (ref 33–136)
ALT SERPL W P-5'-P-CCNC: 66 U/L (ref 7–45)
ANION GAP SERPL CALC-SCNC: 9 MMOL/L (ref 10–20)
AST SERPL W P-5'-P-CCNC: 89 U/L (ref 9–39)
BASOPHILS # BLD AUTO: 0.02 X10*3/UL (ref 0–0.1)
BASOPHILS NFR BLD AUTO: 0.6 %
BILIRUB SERPL-MCNC: 1.3 MG/DL (ref 0–1.2)
BUN SERPL-MCNC: 9 MG/DL (ref 6–23)
CALCIUM SERPL-MCNC: 8.8 MG/DL (ref 8.6–10.3)
CHLORIDE SERPL-SCNC: 102 MMOL/L (ref 98–107)
CO2 SERPL-SCNC: 25 MMOL/L (ref 21–32)
CREAT SERPL-MCNC: 0.61 MG/DL (ref 0.5–1.05)
EGFRCR SERPLBLD CKD-EPI 2021: >90 ML/MIN/1.73M*2
EOSINOPHIL # BLD AUTO: 0.08 X10*3/UL (ref 0–0.7)
EOSINOPHIL NFR BLD AUTO: 2.5 %
ERYTHROCYTE [DISTWIDTH] IN BLOOD BY AUTOMATED COUNT: 12.5 % (ref 11.5–14.5)
GLUCOSE SERPL-MCNC: 99 MG/DL (ref 74–99)
HCT VFR BLD AUTO: 32.4 % (ref 36–46)
HGB BLD-MCNC: 11.1 G/DL (ref 12–16)
IMM GRANULOCYTES # BLD AUTO: 0.01 X10*3/UL (ref 0–0.7)
IMM GRANULOCYTES NFR BLD AUTO: 0.3 % (ref 0–0.9)
LYMPHOCYTES # BLD AUTO: 0.67 X10*3/UL (ref 1.2–4.8)
LYMPHOCYTES NFR BLD AUTO: 20.7 %
MAGNESIUM SERPL-MCNC: 1.41 MG/DL (ref 1.6–2.4)
MCH RBC QN AUTO: 34 PG (ref 26–34)
MCHC RBC AUTO-ENTMCNC: 34.3 G/DL (ref 32–36)
MCV RBC AUTO: 99 FL (ref 80–100)
MONOCYTES # BLD AUTO: 0.46 X10*3/UL (ref 0.1–1)
MONOCYTES NFR BLD AUTO: 14.2 %
NEUTROPHILS # BLD AUTO: 2 X10*3/UL (ref 1.2–7.7)
NEUTROPHILS NFR BLD AUTO: 61.7 %
NRBC BLD-RTO: 0 /100 WBCS (ref 0–0)
PLATELET # BLD AUTO: 133 X10*3/UL (ref 150–450)
POTASSIUM SERPL-SCNC: 3.4 MMOL/L (ref 3.5–5.3)
PROT SERPL-MCNC: 6.2 G/DL (ref 6.4–8.2)
RBC # BLD AUTO: 3.26 X10*6/UL (ref 4–5.2)
SODIUM SERPL-SCNC: 133 MMOL/L (ref 136–145)
WBC # BLD AUTO: 3.2 X10*3/UL (ref 4.4–11.3)

## 2025-01-08 PROCEDURE — 2550000001 HC RX 255 CONTRASTS: Performed by: EMERGENCY MEDICINE

## 2025-01-08 PROCEDURE — 97165 OT EVAL LOW COMPLEX 30 MIN: CPT | Mod: GO

## 2025-01-08 PROCEDURE — 85025 COMPLETE CBC W/AUTO DIFF WBC: CPT

## 2025-01-08 PROCEDURE — 36415 COLL VENOUS BLD VENIPUNCTURE: CPT

## 2025-01-08 PROCEDURE — 2500000001 HC RX 250 WO HCPCS SELF ADMINISTERED DRUGS (ALT 637 FOR MEDICARE OP)

## 2025-01-08 PROCEDURE — 2500000004 HC RX 250 GENERAL PHARMACY W/ HCPCS (ALT 636 FOR OP/ED): Performed by: EMERGENCY MEDICINE

## 2025-01-08 PROCEDURE — 80053 COMPREHEN METABOLIC PANEL: CPT

## 2025-01-08 PROCEDURE — A9575 INJ GADOTERATE MEGLUMI 0.1ML: HCPCS | Performed by: EMERGENCY MEDICINE

## 2025-01-08 PROCEDURE — 2500000002 HC RX 250 W HCPCS SELF ADMINISTERED DRUGS (ALT 637 FOR MEDICARE OP, ALT 636 FOR OP/ED)

## 2025-01-08 PROCEDURE — 70553 MRI BRAIN STEM W/O & W/DYE: CPT

## 2025-01-08 PROCEDURE — 70553 MRI BRAIN STEM W/O & W/DYE: CPT | Performed by: STUDENT IN AN ORGANIZED HEALTH CARE EDUCATION/TRAINING PROGRAM

## 2025-01-08 PROCEDURE — 2020000001 HC ICU ROOM DAILY

## 2025-01-08 PROCEDURE — 2500000004 HC RX 250 GENERAL PHARMACY W/ HCPCS (ALT 636 FOR OP/ED): Performed by: NURSE PRACTITIONER

## 2025-01-08 PROCEDURE — 83735 ASSAY OF MAGNESIUM: CPT

## 2025-01-08 PROCEDURE — 99291 CRITICAL CARE FIRST HOUR: CPT | Performed by: NURSE PRACTITIONER

## 2025-01-08 PROCEDURE — 2500000004 HC RX 250 GENERAL PHARMACY W/ HCPCS (ALT 636 FOR OP/ED)

## 2025-01-08 PROCEDURE — 97161 PT EVAL LOW COMPLEX 20 MIN: CPT | Mod: GP | Performed by: PHYSICAL THERAPIST

## 2025-01-08 PROCEDURE — 99232 SBSQ HOSP IP/OBS MODERATE 35: CPT

## 2025-01-08 RX ORDER — GADOTERATE MEGLUMINE 376.9 MG/ML
0.2 INJECTION INTRAVENOUS
Status: COMPLETED | OUTPATIENT
Start: 2025-01-08 | End: 2025-01-08

## 2025-01-08 RX ORDER — MAGNESIUM SULFATE HEPTAHYDRATE 40 MG/ML
4 INJECTION, SOLUTION INTRAVENOUS ONCE
Status: COMPLETED | OUTPATIENT
Start: 2025-01-08 | End: 2025-01-08

## 2025-01-08 RX ORDER — POTASSIUM CHLORIDE 20 MEQ/1
40 TABLET, EXTENDED RELEASE ORAL ONCE
Status: COMPLETED | OUTPATIENT
Start: 2025-01-08 | End: 2025-01-08

## 2025-01-08 RX ADMIN — POTASSIUM CHLORIDE 40 MEQ: 1500 TABLET, EXTENDED RELEASE ORAL at 06:19

## 2025-01-08 RX ADMIN — THIAMINE HYDROCHLORIDE 500 MG: 100 INJECTION, SOLUTION INTRAMUSCULAR; INTRAVENOUS at 09:31

## 2025-01-08 RX ADMIN — MAGNESIUM SULFATE HEPTAHYDRATE 4 G: 40 INJECTION, SOLUTION INTRAVENOUS at 07:50

## 2025-01-08 RX ADMIN — PHENOBARBITAL SODIUM 130 MG: 65 INJECTION INTRAMUSCULAR; INTRAVENOUS at 15:47

## 2025-01-08 RX ADMIN — FAMOTIDINE 20 MG: 20 TABLET, FILM COATED ORAL at 08:07

## 2025-01-08 RX ADMIN — PHENOBARBITAL SODIUM 65 MG: 65 INJECTION INTRAMUSCULAR; INTRAVENOUS at 09:31

## 2025-01-08 RX ADMIN — THIAMINE HYDROCHLORIDE 500 MG: 100 INJECTION, SOLUTION INTRAMUSCULAR; INTRAVENOUS at 00:29

## 2025-01-08 RX ADMIN — GADOTERATE MEGLUMINE 12.5 ML: 376.9 INJECTION INTRAVENOUS at 18:27

## 2025-01-08 RX ADMIN — PHENOBARBITAL SODIUM 65 MG: 65 INJECTION INTRAMUSCULAR; INTRAVENOUS at 02:59

## 2025-01-08 RX ADMIN — FOLIC ACID 1 MG: 1 TABLET ORAL at 08:07

## 2025-01-08 RX ADMIN — THIAMINE HYDROCHLORIDE 500 MG: 100 INJECTION, SOLUTION INTRAMUSCULAR; INTRAVENOUS at 15:48

## 2025-01-08 RX ADMIN — Medication 1 TABLET: at 08:07

## 2025-01-08 RX ADMIN — FAMOTIDINE 20 MG: 20 TABLET, FILM COATED ORAL at 21:05

## 2025-01-08 ASSESSMENT — LIFESTYLE VARIABLES
AUDITORY DISTURBANCES: NOT PRESENT
HEADACHE, FULLNESS IN HEAD: NOT PRESENT
ANXIETY: 2
TREMOR: NOT VISIBLE, BUT CAN BE FELT FINGERTIP TO FINGERTIP
AGITATION: NORMAL ACTIVITY
TOTAL SCORE: 8
AGITATION: NORMAL ACTIVITY
ORIENTATION AND CLOUDING OF SENSORIUM: CANNOT DO SERIAL ADDITIONS OR IS UNCERTAIN ABOUT DATE
HEADACHE, FULLNESS IN HEAD: NOT PRESENT
TOTAL SCORE: 6
HEADACHE, FULLNESS IN HEAD: NOT PRESENT
TOTAL SCORE: 6
NAUSEA AND VOMITING: NO NAUSEA AND NO VOMITING
AUDITORY DISTURBANCES: NOT PRESENT
VISUAL DISTURBANCES: NOT PRESENT
PAROXYSMAL SWEATS: 2
VISUAL DISTURBANCES: NOT PRESENT
ANXIETY: MILDLY ANXIOUS
ORIENTATION AND CLOUDING OF SENSORIUM: CANNOT DO SERIAL ADDITIONS OR IS UNCERTAIN ABOUT DATE
VISUAL DISTURBANCES: NOT PRESENT
AUDITORY DISTURBANCES: NOT PRESENT
AGITATION: NORMAL ACTIVITY
AUDITORY DISTURBANCES: NOT PRESENT
AUDITORY DISTURBANCES: NOT PRESENT
VISUAL DISTURBANCES: NOT PRESENT
TREMOR: 2
HEADACHE, FULLNESS IN HEAD: NOT PRESENT
ANXIETY: 2
ORIENTATION AND CLOUDING OF SENSORIUM: CANNOT DO SERIAL ADDITIONS OR IS UNCERTAIN ABOUT DATE
HEADACHE, FULLNESS IN HEAD: NOT PRESENT
NAUSEA AND VOMITING: NO NAUSEA AND NO VOMITING
TREMOR: 2
VISUAL DISTURBANCES: NOT PRESENT
PAROXYSMAL SWEATS: 2
ORIENTATION AND CLOUDING OF SENSORIUM: CANNOT DO SERIAL ADDITIONS OR IS UNCERTAIN ABOUT DATE
ORIENTATION AND CLOUDING OF SENSORIUM: CANNOT DO SERIAL ADDITIONS OR IS UNCERTAIN ABOUT DATE
HEADACHE, FULLNESS IN HEAD: NOT PRESENT
ANXIETY: 2
ORIENTATION AND CLOUDING OF SENSORIUM: CANNOT DO SERIAL ADDITIONS OR IS UNCERTAIN ABOUT DATE
PAROXYSMAL SWEATS: 2
ANXIETY: MILDLY ANXIOUS
PAROXYSMAL SWEATS: 2
NAUSEA AND VOMITING: NO NAUSEA AND NO VOMITING
ORIENTATION AND CLOUDING OF SENSORIUM: CANNOT DO SERIAL ADDITIONS OR IS UNCERTAIN ABOUT DATE
AGITATION: SOMEWHAT MORE THAN NORMAL ACTIVITY
VISUAL DISTURBANCES: NOT PRESENT
HEADACHE, FULLNESS IN HEAD: NOT PRESENT
HEADACHE, FULLNESS IN HEAD: NOT PRESENT
PAROXYSMAL SWEATS: 2
AGITATION: NORMAL ACTIVITY
AUDITORY DISTURBANCES: NOT PRESENT
TREMOR: 2
NAUSEA AND VOMITING: NO NAUSEA AND NO VOMITING
HEADACHE, FULLNESS IN HEAD: NOT PRESENT
ANXIETY: MILDLY ANXIOUS
TOTAL SCORE: 6
TREMOR: 2
ANXIETY: 2
TOTAL SCORE: 6
TOTAL SCORE: 7
NAUSEA AND VOMITING: NO NAUSEA AND NO VOMITING
AGITATION: NORMAL ACTIVITY
AUDITORY DISTURBANCES: NOT PRESENT
AUDITORY DISTURBANCES: NOT PRESENT
VISUAL DISTURBANCES: NOT PRESENT
TOTAL SCORE: 7
PAROXYSMAL SWEATS: 2
VISUAL DISTURBANCES: NOT PRESENT
PAROXYSMAL SWEATS: 2
TOTAL SCORE: 6
VISUAL DISTURBANCES: NOT PRESENT
ORIENTATION AND CLOUDING OF SENSORIUM: CANNOT DO SERIAL ADDITIONS OR IS UNCERTAIN ABOUT DATE
ANXIETY: MILDLY ANXIOUS
PAROXYSMAL SWEATS: 2
ANXIETY: MILDLY ANXIOUS
TREMOR: 2
PAROXYSMAL SWEATS: 2
AGITATION: NORMAL ACTIVITY
AUDITORY DISTURBANCES: NOT PRESENT
TREMOR: 2
ORIENTATION AND CLOUDING OF SENSORIUM: CANNOT DO SERIAL ADDITIONS OR IS UNCERTAIN ABOUT DATE
NAUSEA AND VOMITING: NO NAUSEA AND NO VOMITING
ANXIETY: 2
ANXIETY: 2
AGITATION: NORMAL ACTIVITY
AGITATION: NORMAL ACTIVITY
ORIENTATION AND CLOUDING OF SENSORIUM: CANNOT DO SERIAL ADDITIONS OR IS UNCERTAIN ABOUT DATE
NAUSEA AND VOMITING: NO NAUSEA AND NO VOMITING
PAROXYSMAL SWEATS: 2
NAUSEA AND VOMITING: NO NAUSEA AND NO VOMITING
TREMOR: 2
AUDITORY DISTURBANCES: NOT PRESENT
VISUAL DISTURBANCES: NOT PRESENT
TOTAL SCORE: 6
HEADACHE, FULLNESS IN HEAD: NOT PRESENT
PAROXYSMAL SWEATS: 2
NAUSEA AND VOMITING: NO NAUSEA AND NO VOMITING
NAUSEA AND VOMITING: NO NAUSEA AND NO VOMITING
TREMOR: 2
TOTAL SCORE: 7
HEADACHE, FULLNESS IN HEAD: NOT PRESENT
VISUAL DISTURBANCES: NOT PRESENT
AUDITORY DISTURBANCES: NOT PRESENT
TREMOR: 2
TOTAL SCORE: 8
NAUSEA AND VOMITING: NO NAUSEA AND NO VOMITING
AGITATION: SOMEWHAT MORE THAN NORMAL ACTIVITY
ORIENTATION AND CLOUDING OF SENSORIUM: CANNOT DO SERIAL ADDITIONS OR IS UNCERTAIN ABOUT DATE
TREMOR: 2
AGITATION: NORMAL ACTIVITY

## 2025-01-08 ASSESSMENT — PAIN - FUNCTIONAL ASSESSMENT
PAIN_FUNCTIONAL_ASSESSMENT: 0-10

## 2025-01-08 ASSESSMENT — COGNITIVE AND FUNCTIONAL STATUS - GENERAL
STANDING UP FROM CHAIR USING ARMS: A LITTLE
WALKING IN HOSPITAL ROOM: A LOT
CLIMB 3 TO 5 STEPS WITH RAILING: TOTAL
HELP NEEDED FOR BATHING: A LITTLE
DAILY ACTIVITIY SCORE: 16
PERSONAL GROOMING: A LITTLE
MOBILITY SCORE: 16
TOILETING: A LITTLE
STANDING UP FROM CHAIR USING ARMS: A LOT
EATING MEALS: A LITTLE
TURNING FROM BACK TO SIDE WHILE IN FLAT BAD: A LITTLE
DRESSING REGULAR LOWER BODY CLOTHING: A LOT
MOVING FROM LYING ON BACK TO SITTING ON SIDE OF FLAT BED WITH BEDRAILS: A LITTLE
DAILY ACTIVITIY SCORE: 17
EATING MEALS: A LITTLE
TOILETING: A LOT
DRESSING REGULAR UPPER BODY CLOTHING: A LITTLE
EATING MEALS: A LITTLE
WALKING IN HOSPITAL ROOM: A LOT
TURNING FROM BACK TO SIDE WHILE IN FLAT BAD: A LITTLE
PERSONAL GROOMING: A LITTLE
WALKING IN HOSPITAL ROOM: A LOT
DRESSING REGULAR UPPER BODY CLOTHING: A LITTLE
DRESSING REGULAR LOWER BODY CLOTHING: A LITTLE
TOILETING: A LOT
DRESSING REGULAR LOWER BODY CLOTHING: A LITTLE
CLIMB 3 TO 5 STEPS WITH RAILING: TOTAL
MOVING TO AND FROM BED TO CHAIR: A LITTLE
MOBILITY SCORE: 13
HELP NEEDED FOR BATHING: A LOT
CLIMB 3 TO 5 STEPS WITH RAILING: TOTAL
PERSONAL GROOMING: A LITTLE
MOBILITY SCORE: 16
HELP NEEDED FOR BATHING: A LITTLE
STANDING UP FROM CHAIR USING ARMS: A LITTLE
MOVING TO AND FROM BED TO CHAIR: A LITTLE
TURNING FROM BACK TO SIDE WHILE IN FLAT BAD: A LITTLE
MOVING TO AND FROM BED TO CHAIR: A LOT
DAILY ACTIVITIY SCORE: 17
DRESSING REGULAR UPPER BODY CLOTHING: A LITTLE

## 2025-01-08 ASSESSMENT — ACTIVITIES OF DAILY LIVING (ADL): BATHING_ASSISTANCE: MODERATE

## 2025-01-08 ASSESSMENT — PAIN SCALES - GENERAL
PAINLEVEL_OUTOF10: 0 - NO PAIN

## 2025-01-08 NOTE — CARE PLAN
The patient's goals for the shift include go home     The clinical goals for the shift include Patient will be free from injury throughout this shift.    Over the shift, the patient did not make progress toward the following goals. Barriers to progression include impulsive and does not to well with redirection. Recommendations to address these barriers include 1 on 1 sitter.

## 2025-01-08 NOTE — PROGRESS NOTES
[facility] Critical Care Medicine       Date:  1/8/2025  Patient:  Carmella Frank  YOB: 1960  MRN:  [id number]   Admit Date:  1/6/2025  ========================================================================================================    Chief Complaint   Patient presents with    Fall     Pt had fall on cristofer zeeshan, was seen at [facility], they did a scan and ruled she had a concussion. Since the fall, pt family member states she is extremely forgetful, sleeping a lot, and not acting normal. PCP sent her here due to thinking she has a slow bleed.          History of Present Illness:  Carmella Frank is a 64 y.o. year old female patient with Past Medical History of HTN and daily ETOH consumption who presented to [facility] for evaluation of lethargy and increased confusion. Per her family that was present in ER, patient fell on 12/24/24 and hit her head on a metal coffee table, +LOC, presented to [facility] ER, CT imaging at the time was negative and was diagnosed with a concussion. Yesterday 1/6/25 she was evaluated by her PCP for continued confusion and hallucinations, given recent fall with head trauma her PCP advised evaluation at ER.     She was admitted to [facility] 1/6/24, initiated on CIWA with Ativan. Today had unwitnessed fall, was at bedside and found on her knee. No tenderness/swelling noted. When being assisted back to bed, became agitated and aggressive, pulled out her PIV. ICU consulted for concern of worsening withdrawal symptoms. Family notes that since her fall, she has had gait imbalances, complaints of headache, hallucinations and short term memory loss.       Interval ICU Events:  1//7: Admitted to ICU, phenobarb loading. Continue to monitor for increasing agitation. Trauma surgery evaluation for SDH. EEG pending   1/8: EEG negative epileptiform discharges. AAO x3, easily redirected, mild tremors, denies a/v hallucinations.     Medical History:  History reviewed. No pertinent past medical  history.  Past Surgical History:   Procedure Laterality Date    OTHER SURGICAL HISTORY  10/19/2021    No history of surgery     No medications prior to admission.     Patient has no known allergies.  Social History     Tobacco Use    Smoking status: Never    Smokeless tobacco: Never   Vaping Use    Vaping status: Never Used     No family history on file.    Review of Systems:  14 point review of systems was completed and negative except for those specially mention in my HPI    Physical Exam:    Heart Rate:  []   Temp:  [36.1 °C (97 °F)-37.5 °C (99.5 °F)]   Resp:  [16-35]   BP: (118-175)/(62-94)   Weight:  [64.4 kg (141 lb 15.6 oz)-65.3 kg (143 lb 15.4 oz)]   SpO2:  [93 %-97 %]     Physical Exam  Vitals and nursing note reviewed.   Constitutional:       Appearance: She is normal weight.   HENT:      Mouth/Throat:      Mouth: Mucous membranes are moist.   Eyes:      Extraocular Movements: Extraocular movements intact.      Conjunctiva/sclera: Conjunctivae normal.      Pupils: Pupils are equal, round, and reactive to light.   Cardiovascular:      Rate and Rhythm: Normal rate and regular rhythm.      Pulses: Normal pulses.      Heart sounds: Normal heart sounds.   Pulmonary:      Effort: Pulmonary effort is normal.      Breath sounds: Normal breath sounds.   Abdominal:      General: Abdomen is flat. There is no distension.      Palpations: Abdomen is soft.      Tenderness: There is no abdominal tenderness.   Musculoskeletal:      Right lower leg: No edema.      Left lower leg: No edema.   Skin:     General: Skin is warm.      Capillary Refill: Capillary refill takes less than 2 seconds.   Neurological:      Mental Status: She is alert and oriented to person, place, and time. She is confused.      Motor: Tremor present.   Psychiatric:         Judgment: Judgment is impulsive.         Objective:    I have reviewed all medications, laboratory results, and imaging pertinent for today's  encounter    Assessment/Plan:    I am currently managing this critically ill patient for the following problems:    Neuro/Psych/Pain Ctrl/Sedation:  Alcohol withdrawal daily wine, 2-3 glasses/day consistently for >1 year, no known hx DT  Chronic subdural hematoma [facility] NS: follow up with NS in 2 weeks  Alcohol-[id number]  Phenobarb, received 10mg/ kg load  B12 normal, MMA pending   EEG, negative  MRI brain, pending  [facility] ICU  Delirium precautions  Consider psychiatry consult, will need to follow up OP    Respiratory/ENT:  No active issues  O2 supplementation as needed to maintain SpO2 >92%    Cardiovascular:  Hx HTN  No home meds  Maintain MAP >65    GI:  Transaminitis improving  Regular diet  Daily CMP    Renal/Volume Status (Intra & Extravascular):  No active issues  Monitor UOP    Endocrine  Goal BG <180  POCT as needed    Infectious Disease:  No concern for systemic infection  Monitor for SIRS  Resp viral panel negative    Heme/Onc:  No active issues  Monitor CBC    OBGYN/MSK:  Mechanical fall  No injuries noted  Falls precautions  Sitter at bedside  PT/ OT  ACS/ trauma signed off    Ethics/Code Status:  Full Code    :  DVT Prophylaxis: hold, SDH  GI Prophylaxis: Pepcid  Bowel Regimen: None  Diet: Regular  CVC: None  [place]: None  [place]: None  Restraints: None  Dispo: ICU    Critical Care Time:  35 minutes spent in preparing to see patient (I.e. review of medical records), evaluation of diagnostics (I.e. labs, imaging, etc.), documentation, discussing plan of care with patient/ family/ caregiver, and/ or coordination of care with multidisciplinary team. Time does not include completion of procedure time.      Plan discussed with Dr. Brent Stock, APRN-CNP

## 2025-01-08 NOTE — PROGRESS NOTES
"Carmella Frank is a 64 y.o. female on day 1 of admission presenting with Recurrent falls.      Subjective   Pt. In recliner, sitter at bedside. She is tremulous, but mentation is stable. Discussed rEEG results showing no epileptiform activity with  and dtr at bedside. Mri brain w/wo is pending.  Review of systems are negative unless otherwise specified in HPI.        Objective     Last Recorded Vitals  Blood pressure 142/89, pulse 82, temperature 36.3 °C (97.3 °F), temperature source Temporal, resp. rate (!) 27, height 1.651 m (5' 5\"), weight 64.4 kg (141 lb 15.6 oz), SpO2 99%.      Relevant Results  Results for orders placed or performed during the hospital encounter of 01/06/25 (from the past 96 hours)   ECG 12 lead   Result Value Ref Range    Ventricular Rate 87 BPM    Atrial Rate 87 BPM    MO Interval 120 ms    QRS Duration 70 ms    QT Interval 380 ms    QTC Calculation(Bazett) 457 ms    P Axis 50 degrees    R Axis 39 degrees    T Axis -3 degrees    QRS Count 15 beats    Q Onset 224 ms    P Onset 164 ms    P Offset 211 ms    T Offset 414 ms    QTC Fredericia 430 ms   Comprehensive metabolic panel   Result Value Ref Range    Glucose 105 (H) 74 - 99 mg/dL    Sodium 141 136 - 145 mmol/L    Potassium 4.0 3.5 - 5.3 mmol/L    Chloride 104 98 - 107 mmol/L    Bicarbonate 26 21 - 32 mmol/L    Anion Gap 15 10 - 20 mmol/L    Urea Nitrogen 9 6 - 23 mg/dL    Creatinine 0.53 0.50 - 1.05 mg/dL    eGFR >90 >60 mL/min/1.73m*2    Calcium 8.6 8.6 - 10.3 mg/dL    Albumin 3.8 3.4 - 5.0 g/dL    Alkaline Phosphatase 111 33 - 136 U/L    Total Protein 7.1 6.4 - 8.2 g/dL     (H) 9 - 39 U/L    Bilirubin, Total 0.6 0.0 - 1.2 mg/dL     (H) 7 - 45 U/L   Lactate   Result Value Ref Range    Lactate 2.7 (H) 0.4 - 2.0 mmol/L   Blood Gas Venous Full Panel   Result Value Ref Range    POCT pH, Venous 7.44 (H) 7.33 - 7.43 pH    POCT pCO2, Venous 39 (L) 41 - 51 mm Hg    POCT pO2, Venous 45 35 - 45 mm Hg    POCT SO2, Venous 72 45 - " 75 %    POCT Oxy Hemoglobin, Venous 70.0 45.0 - 75.0 %    POCT Hematocrit Calculated, Venous 49.0 (H) 36.0 - 46.0 %    POCT Sodium, Venous 139 136 - 145 mmol/L    POCT Potassium, Venous 4.4 3.5 - 5.3 mmol/L    POCT Chloride, Venous 104 98 - 107 mmol/L    POCT Ionized Calicum, Venous 0.95 (L) 1.10 - 1.33 mmol/L    POCT Glucose, Venous 106 (H) 74 - 99 mg/dL    POCT Lactate, Venous 3.0 (H) 0.4 - 2.0 mmol/L    POCT Base Excess, Venous 2.3 -2.0 - 3.0 mmol/L    POCT HCO3 Calculated, Venous 26.5 (H) 22.0 - 26.0 mmol/L    POCT Hemoglobin, Venous 16.3 (H) 12.0 - 16.0 g/dL    POCT Anion Gap, Venous 13.0 10.0 - 25.0 mmol/L    Patient Temperature      FiO2 21 %   Protime-INR   Result Value Ref Range    Protime 11.1 9.8 - 12.8 seconds    INR 1.0 0.9 - 1.1   CBC and Auto Differential   Result Value Ref Range    WBC 3.0 (L) 4.4 - 11.3 x10*3/uL    nRBC 0.0 0.0 - 0.0 /100 WBCs    RBC 3.95 (L) 4.00 - 5.20 x10*6/uL    Hemoglobin 13.4 12.0 - 16.0 g/dL    Hematocrit 38.5 36.0 - 46.0 %    MCV 98 80 - 100 fL    MCH 33.9 26.0 - 34.0 pg    MCHC 34.8 32.0 - 36.0 g/dL    RDW 12.7 11.5 - 14.5 %    Platelets 183 150 - 450 x10*3/uL    Neutrophils % 47.6 40.0 - 80.0 %    Immature Granulocytes %, Automated 0.7 0.0 - 0.9 %    Lymphocytes % 34.1 13.0 - 44.0 %    Monocytes % 12.2 2.0 - 10.0 %    Eosinophils % 3.4 0.0 - 6.0 %    Basophils % 2.0 0.0 - 2.0 %    Neutrophils Absolute 1.41 1.20 - 7.70 x10*3/uL    Immature Granulocytes Absolute, Automated 0.02 0.00 - 0.70 x10*3/uL    Lymphocytes Absolute 1.01 (L) 1.20 - 4.80 x10*3/uL    Monocytes Absolute 0.36 0.10 - 1.00 x10*3/uL    Eosinophils Absolute 0.10 0.00 - 0.70 x10*3/uL    Basophils Absolute 0.06 0.00 - 0.10 x10*3/uL   Magnesium   Result Value Ref Range    Magnesium 1.72 1.60 - 2.40 mg/dL   B-Type Natriuretic Peptide   Result Value Ref Range    BNP 85 0 - 99 pg/mL   Acute Toxicology Panel, Blood   Result Value Ref Range    Acetaminophen <10.0 10.0 - 30.0 ug/mL    Salicylate  <3 4 - 20 mg/dL     Alcohol 339 (H) <=10 mg/dL   Ammonia   Result Value Ref Range    Ammonia 37 16 - 53 umol/L   Troponin I, High Sensitivity, Initial   Result Value Ref Range    Troponin I, High Sensitivity 7 0 - 13 ng/L   Influenza A, and B PCR   Result Value Ref Range    Flu A Result Not Detected Not Detected    Flu B Result Not Detected Not Detected   Sars-CoV-2 PCR   Result Value Ref Range    Coronavirus 2019, PCR Not Detected Not Detected   RSV PCR   Result Value Ref Range    RSV PCR Not Detected Not Detected   Drug Screen, Urine   Result Value Ref Range    Amphetamine Screen, Urine Presumptive Negative Presumptive Negative    Barbiturate Screen, Urine Presumptive Negative Presumptive Negative    Benzodiazepines Screen, Urine Presumptive Negative Presumptive Negative    Cannabinoid Screen, Urine Presumptive Negative Presumptive Negative    Cocaine Metabolite Screen, Urine Presumptive Negative Presumptive Negative    Fentanyl Screen, Urine Presumptive Negative Presumptive Negative    Opiate Screen, Urine Presumptive Negative Presumptive Negative    Oxycodone Screen, Urine Presumptive Negative Presumptive Negative    PCP Screen, Urine Presumptive Negative Presumptive Negative    Methadone Screen, Urine Presumptive Negative Presumptive Negative   Urinalysis with Reflex Culture and Microscopic   Result Value Ref Range    Color, Urine Light-Yellow Light-Yellow, Yellow, Dark-Yellow    Appearance, Urine Clear Clear    Specific Gravity, Urine 1.013 1.005 - 1.035    pH, Urine 6.5 5.0, 5.5, 6.0, 6.5, 7.0, 7.5, 8.0    Protein, Urine NEGATIVE NEGATIVE, 10 (TRACE), 20 (TRACE) mg/dL    Glucose, Urine Normal Normal mg/dL    Blood, Urine NEGATIVE NEGATIVE    Ketones, Urine NEGATIVE NEGATIVE mg/dL    Bilirubin, Urine NEGATIVE NEGATIVE    Urobilinogen, Urine Normal Normal mg/dL    Nitrite, Urine NEGATIVE NEGATIVE    Leukocyte Esterase, Urine NEGATIVE NEGATIVE   Extra Urine Gray Tube   Result Value Ref Range    Extra Tube Hold for add-ons.     Troponin, High Sensitivity, 1 Hour   Result Value Ref Range    Troponin I, High Sensitivity 7 0 - 13 ng/L   Blood Gas Lactic Acid, Venous   Result Value Ref Range    POCT Lactate, Venous 2.6 (H) 0.4 - 2.0 mmol/L   Lactate   Result Value Ref Range    Lactate 1.9 0.4 - 2.0 mmol/L   CBC   Result Value Ref Range    WBC 3.1 (L) 4.4 - 11.3 x10*3/uL    nRBC 0.0 0.0 - 0.0 /100 WBCs    RBC 3.49 (L) 4.00 - 5.20 x10*6/uL    Hemoglobin 11.9 (L) 12.0 - 16.0 g/dL    Hematocrit 34.8 (L) 36.0 - 46.0 %     80 - 100 fL    MCH 34.1 (H) 26.0 - 34.0 pg    MCHC 34.2 32.0 - 36.0 g/dL    RDW 13.0 11.5 - 14.5 %    Platelets 156 150 - 450 x10*3/uL   Comprehensive metabolic panel   Result Value Ref Range    Glucose 110 (H) 74 - 99 mg/dL    Sodium 139 136 - 145 mmol/L    Potassium 4.2 3.5 - 5.3 mmol/L    Chloride 102 98 - 107 mmol/L    Bicarbonate 26 21 - 32 mmol/L    Anion Gap 15 10 - 20 mmol/L    Urea Nitrogen 11 6 - 23 mg/dL    Creatinine 0.64 0.50 - 1.05 mg/dL    eGFR >90 >60 mL/min/1.73m*2    Calcium 8.5 (L) 8.6 - 10.3 mg/dL    Albumin 3.9 3.4 - 5.0 g/dL    Alkaline Phosphatase 88 33 - 136 U/L    Total Protein 6.3 (L) 6.4 - 8.2 g/dL     (H) 9 - 39 U/L    Bilirubin, Total 1.0 0.0 - 1.2 mg/dL    ALT 88 (H) 7 - 45 U/L   TSH with reflex to Free T4 if abnormal   Result Value Ref Range    Thyroid Stimulating Hormone 1.35 0.44 - 3.98 mIU/L   Gamma-Glutamyl Transferase   Result Value Ref Range    GGT 1,049 (H) 5 - 55 U/L   Folate   Result Value Ref Range    Folate, Serum 6.2 >5.0 ng/mL   Vitamin B12   Result Value Ref Range    Vitamin B12 274 211 - 911 pg/mL   Magnesium   Result Value Ref Range    Magnesium 1.41 (L) 1.60 - 2.40 mg/dL   CBC and Auto Differential   Result Value Ref Range    WBC 3.2 (L) 4.4 - 11.3 x10*3/uL    nRBC 0.0 0.0 - 0.0 /100 WBCs    RBC 3.26 (L) 4.00 - 5.20 x10*6/uL    Hemoglobin 11.1 (L) 12.0 - 16.0 g/dL    Hematocrit 32.4 (L) 36.0 - 46.0 %    MCV 99 80 - 100 fL    MCH 34.0 26.0 - 34.0 pg    MCHC 34.3 32.0 -  36.0 g/dL    RDW 12.5 11.5 - 14.5 %    Platelets 133 (L) 150 - 450 x10*3/uL    Neutrophils % 61.7 40.0 - 80.0 %    Immature Granulocytes %, Automated 0.3 0.0 - 0.9 %    Lymphocytes % 20.7 13.0 - 44.0 %    Monocytes % 14.2 2.0 - 10.0 %    Eosinophils % 2.5 0.0 - 6.0 %    Basophils % 0.6 0.0 - 2.0 %    Neutrophils Absolute 2.00 1.20 - 7.70 x10*3/uL    Immature Granulocytes Absolute, Automated 0.01 0.00 - 0.70 x10*3/uL    Lymphocytes Absolute 0.67 (L) 1.20 - 4.80 x10*3/uL    Monocytes Absolute 0.46 0.10 - 1.00 x10*3/uL    Eosinophils Absolute 0.08 0.00 - 0.70 x10*3/uL    Basophils Absolute 0.02 0.00 - 0.10 x10*3/uL   Comprehensive Metabolic Panel   Result Value Ref Range    Glucose 99 74 - 99 mg/dL    Sodium 133 (L) 136 - 145 mmol/L    Potassium 3.4 (L) 3.5 - 5.3 mmol/L    Chloride 102 98 - 107 mmol/L    Bicarbonate 25 21 - 32 mmol/L    Anion Gap 9 (L) 10 - 20 mmol/L    Urea Nitrogen 9 6 - 23 mg/dL    Creatinine 0.61 0.50 - 1.05 mg/dL    eGFR >90 >60 mL/min/1.73m*2    Calcium 8.8 8.6 - 10.3 mg/dL    Albumin 3.6 3.4 - 5.0 g/dL    Alkaline Phosphatase 87 33 - 136 U/L    Total Protein 6.2 (L) 6.4 - 8.2 g/dL    AST 89 (H) 9 - 39 U/L    Bilirubin, Total 1.3 (H) 0.0 - 1.2 mg/dL    ALT 66 (H) 7 - 45 U/L   EEG    Result Date: 1/7/2025  IMPRESSION This routine EEG is normal. No epileptiform discharges or lateralizing signs are seen. However, clinical correlation is strongly recommended. This report has been interpreted and electronically signed by    CT head wo IV contrast    Result Date: 1/7/2025  Interpreted By:  Nav Brizuela, STUDY: CT HEAD WO IV CONTRAST;  1/7/2025 11:33 am   INDICATION: Signs/Symptoms:to see progression.     COMPARISON: CT Brain,   ACCESSION NUMBER(S): [id number]   ORDERING CLINICIAN: PAUL CORRALES   TECHNIQUE: CT of the brain from the skull vertex to the skull base, without intravenous contrast   FINDINGS: ACUTE INTRA-AXIAL HEMORRHAGE:  Negative   ACUTE EXTRA-AXIAL/SUBDURAL HEMORRHAGE:  Nonacute, only the  probable old right greater than left subdural hygromas without interval change from one day prior   ACUTE INTRACRANIAL MASS EFFECT:  Negative   CT EVIDENCE OF ACUTE / SUBACUTE TERRITORIAL ISCHEMIA:  Negative   VENTRICLES:  Normal caliber and configuration   OTHER BRAIN FINDINGS:  No additional findings to note   INCLUDED PARANASAL SINUSES: All clear   INCLUDED MASTOID AIR CELLS: All clear   SKULL:  No lytic or blastic lesion   EXTRACRANIAL SOFT TISSUES:  Scalp and occular globes grossly normal by CT       No interval change. No acute intracranial hemorrhage, intra-axial or extra-axial. Old right slightly larger than left bilateral subdural hygromas without acute underlying mass effect is all unchanged   MACRO: None   Signed by: Nav Brizuela 1/7/2025 12:06 PM Dictation workstation:   [id number]    CT head wo IV contrast    Result Date: 1/6/2025  Interpreted By:  Bigg Terrazas, STUDY: CT HEAD WO IV CONTRAST;  1/6/2025 6:00 pm   INDICATION: Trauma. Signs/Symptoms:acting bizarrely, no focal deficits, head injury/fall on xmas zeeshan.     COMPARISON: None.   ACCESSION NUMBER(S): [id number]   ORDERING CLINICIAN: AURELIO OLMSTEAD   TECHNIQUE: Axial noncontrast head CT   FINDINGS: The ventricles, sulci and basal cisterns are mildly prominent compatible with atrophy. There is a mildly heterogeneous process right subdural space estimated about 6 mm thickness coronal image 205/58 with minimal internal heterogeneity without hyperdense component with crescentic appearance favoring nonacute subdural process such as a late subacute or chronic subdural hematoma with displacement of the cortical veins. Suggestion of a similar smaller process on the left estimated at about 2-3 mm thickness coronal image 205/64. There is minimal apparent mass-effect with about 1-2 mm leftward midline shift.   Calvarium: No fracture   Paranasal sinuses and mastoids: Trace fluid within the left mastoid air cells. The visualized portions of the paranasal  sinuses are clear.   Brain Injury (BIG) guidelines CT values:   Skull fracture: No SDH (subdural hematoma): None detected EDH (epidural hemtoma): None detected IPH (intraparenchymal hemorrhage): None detected SAH (subarachnoid hemorrhage): None detected IVH (intraventricular hemorrhage): No   Reference: Julio JORDAN, Rolando RS, Suleman FULTON, et al. The BIG (brain injury guidelines) project: defining the management of traumatic brain injury by acute care surgeons. J Trauma Acute Care Surg. 2014;76:975n646.       No acute intracranial hemorrhage was identified.   Findings favoring bilateral subdural collections right-greater-than-left potentially late subacute or chronic subdural hematoma with displacement of the bridging veins with minimal leftward midline shift. Recommend neurosurgical consultation.   Trace fluid left mastoid air cells.   MACRO: Bigg Terrazas discussed the significance and urgency of this critical finding by secure chat with  AURELIO OLMSTEAD on 1/6/2025 at 6:44 pm.  (**-RCF-**) Findings:  See findings.     Signed by: Bigg Terrazas 1/6/2025 6:44 PM Dictation workstation:   [id number]    ECG 12 lead    Result Date: 1/6/2025  Normal sinus rhythm with sinus arrhythmia Nonspecific ST and T wave abnormality Abnormal ECG When compared with ECG of 01-NOV-2019 08:59, T wave inversion now evident in Inferior leads Nonspecific T wave abnormality, worse in Anterior leads QT has lengthened   Scheduled medications   Medication Dose Route Frequency    famotidine  20 mg oral BID    folic acid  1 mg oral Daily    multivitamin with minerals  1 tablet oral Daily    thiamine  500 mg intravenous q8h     PRN medications   Medication    acetaminophen    Or    acetaminophen    Or    acetaminophen    ondansetron    Or    ondansetron    PHENobarbital           NIH Stroke Scale  1A. Level of Consciousness: Alert, Keenly Responsive  1B. Ask Month and Age: Both Questions Right  1C. Blink Eyes & Squeeze Hands: Performs Both Tasks  2.  Best Gaze: Normal  3. Visual: No Visual Loss  4. Facial Palsy: Normal Symmetrical Movements  5A. Motor - Left Arm: No Drift  5B. Motor - Right Arm: No Drift  6A. Motor - Left Leg: No Drift  6B. Motor - Right Leg: No Drift  7. Limb Ataxia: Absent  8. Sensory Loss: Normal  9. Best Language: No Aphasia  10. Dysarthria: Normal  11. Extinction and Inattention: No Abnormality  NIH Stroke Scale: 0           Zoar Coma Scale  Best Eye Response: Spontaneous  Best Verbal Response: Confused  Best Motor Response: Follows commands  Zoar Coma Scale Score: 14                             Assessment/Plan      Assessment & Plan  Recurrent falls    Altered mental status, unspecified altered mental status type    Impression:  Acute change in mental status most likely alcohol intoxication  Chronic alcohol abuse  History of depression  Bilateral subdural hygromas  Delirium tremens  Recommendations:   Frequent neuro checks  Continue with [facility] protocol  MRI brain w/wo is pending  SW consult for chronic alcoholism   Psychiatry/Psychology for underlying depression     Will continue to follow        I spent 30 minutes in the professional and overall care of this patient.      Ariela Wiseman, CORINA-CNP

## 2025-01-08 NOTE — PROGRESS NOTES
01/08/25 1805   Discharge Planning   Living Arrangements Spouse/significant other;Children   Support Systems Spouse/significant other;Children   Type of Residence Private residence   Home or Post Acute Services Post acute facilities (Rehab/SNF/etc)   Type of Post Acute Facility Services Rehab;Skilled nursing   Expected Discharge Disposition SNF   Does the patient need discharge transport arranged? Yes   RoundTrip coordination needed? Yes   Has discharge transport been arranged? No     Per Interdisciplinary rounding report with team, pt had fall 2 weeks ago around [place], was seen in outside hospital ED and cleared to go home. Pt has had recurrent falls and neurological issues since, CT showed bilateral subdural fluid collections, Neurology following, phenobarb loading. Continue to monitor for increasing agitation. Trauma surgery evaluation for SDH. EEG negative, ETOH on admit 339, pt currently 1:1 sitter, not appropriate for CT team assessment at current time. Will notify [facility] coordinator. CT team will monitor case and follow up for PT/OT eval AMPAC scores to aide in DC planning.

## 2025-01-08 NOTE — PROGRESS NOTES
Occupational Therapy    Evaluation    Patient Name: Carmella Frank  MRN: [id number]  Department: [facility]  Room: 09/09-A  Today's Date: 1/8/2025  Time Calculation  Start Time: 1101  Stop Time: 1114  Time Calculation (min): 13 min      Assessment:  OT Assessment: Limited by balance deficits, decreased safety/poor insight into deficits and impaired coordination which are limiting ability to complete ADLs, transfers and functional mobility.  Prognosis: Good  End of Session Communication: Bedside nurse  End of Session Patient Position: Up in chair, Alarm on (1:1 sitter present.)  OT Assessment Results: Decreased ADL status, Decreased safe judgment during ADL, Decreased functional mobility  Prognosis: Good  Plan:  Treatment Interventions: ADL retraining, Functional transfer training, Neuromuscular reeducation, Fine motor coordination activities  OT Frequency: 3 times per week  OT Discharge Recommendations: High intensity level of continued care  OT - OK to Discharge: Yes (Once medically appropriate.)  Treatment Interventions: ADL retraining, Functional transfer training, Neuromuscular reeducation, Fine motor coordination activities    Subjective   General:  General  Reason for Referral: ADLs  Referred By: CARMELINA Solano CNP (PT/OT 1/7)  Past Medical History Relevant to Rehab: includes: depression, ETOH  Family/Caregiver Present: Yes  Caregiver Feedback: Daughter present - supportive.  Co-Treatment: PT  Co-Treatment Reason: To maximize functional outcomes and patient safety.  Prior to Session Communication: Bedside nurse (Cleared for therapy evaluation by RN.)  Patient Position Received: Bed, 4 rail up, Alarm off, not on at start of session (1:1 sitter present.)  General Comment: Pt. is a 65yo who presented to [facility] ED on 1/6/2025 with behavorial changes, altered mental status and forgetfulness. ETOH = 339, Lacate = 2.6. Per EMR, Pt. fell in 12/24/2024 hitting her head on a table. PT. went to [facility] at that time was  (-). Pt. was D/C'd home with concussion. Pt. presented to [facility] [place] on 1/6/2025 wiht about symptoms. Head CT (1/6): No acute intracranial hemorrhage was identified. Findings favoring bilateral subdural collections right-greater-than-left potentially late subacute or chronic subdural hematoma with displacement of the bridging veins with minimal leftward midline shift. Head CT (1/7): No interval change. No acute intracranial hemorrhage, intra-axial or extra-axial. 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 Per EMR, Pt. fell in hospital room on 1/7/2025 and was transferred to SICU. Hgb (1/8) 11.1 trending down. Admitting Dx: altered mental status, ETOH intoxication, abnormal CT  Precautions:  Medical Precautions: Fall precautions    Vital Sign (Past 2hrs)        Vital Signs Comment: /92; heart rate 74     Pain:  Pain Assessment  Pain Assessment: 0-10  0-10 (Numeric) Pain Score: 0 - No pain    Objective   Cognition:  Overall Cognitive Status:  (Decreased safety/insight into deficits.)  Orientation Level: Disoriented to situation           Home Living:  Home Living Comments: Patient lives with  and daughter in a 2 story house with 3 KEVEN with a HR. Bedroom and full bathroom on the 2nd level, HR available. 1/2 bath on the main level, tub shower with a grab bar on the 2nd level. Laundry on the main level.  Prior Function:  Prior Function Comments: Patient ambulates independently without a device. Independent with ADLs and IADLs. Reports 2 falls in the past 3 months. Patient drives.    ADL:  Eating Assistance:  (Setup/S)  Grooming Assistance: Minimal (Min/mod A for standing balance.)  Bathing Assistance: Moderate  UE Dressing Assistance:  (Setup)  LE Dressing Assistance: Moderate  Toileting Assistance with Device: Minimal  Activity Tolerance:  Endurance: Decreased tolerance for upright activites  Bed Mobility/Transfers:  Bed Mobility  Bed Mobility: Yes  Bed Mobility 1  Bed Mobility 1: Supine to sitting  Bed Mobility Comments 1: HOB elevated. Min A level.    Transfer 1  Technique 1: Sit to stand, Stand to sit  Trials/Comments 1: Patient completed sit to stand from EOB and stand to sit to the recliner at mod A level.    Functional Mobility:  Functional Mobility  Functional Mobility Performed: Yes  Functional Mobility 1  Comments 1: Patient completed functional mobility throughout the room with a FWW and without a device; mod A level without a device and min A with a FWW. Patient with decreased safety, impulsive with mobility and also requires cues for walker management.     Standing Balance:  Static Standing Balance  Static Standing-Comment/Number of Minutes: Fair -  Dynamic Standing Balance  Dynamic Standing-Comments: Poor +     Sensation:  Sensation Comment: Denies paresthesia in UEs.  Strength:  Strength Comments: B/L UE MMT 4/5.     Coordination:  Coordination Comment: B/L digit opposition intact, B/L finger to nose impaired.      Extremities: RUE   RUE : Within Functional Limits and LUE   LUE: Within Functional Limits    Outcome Measures:[facility] Daily Activity  Putting on and taking off regular lower body clothing: A lot  Bathing (including washing, rinsing, drying): A lot  Putting on and taking off regular upper body clothing: A little  Toileting, which includes using toilet, bedpan or urinal: A little  Taking care of personal grooming such as brushing teeth: A little  Eating Meals: A little  Daily Activity - Total Score: 16    Education Documentation  Body Mechanics, taught by Emily Valiente OT at 1/8/2025  1:39 PM.  Learner: Patient  Readiness: Acceptance  Method: Explanation  Response: Needs Reinforcement    EDUCATION:  Education  Individual(s) Educated: Patient  Education Provided: POC discussed and agreed upon, Risk and benefits of OT discussed with patient or other, Fall precautons  Patient Response to Education:  Patient/Caregiver Verbalized Understanding of Information    Goals:  Encounter Problems       Encounter Problems (Active)       OT Goals       Patient will complete household distance functional mobility at a CGA level.  (Progressing)       Start:  01/08/25    Expected End:  01/22/25            Patient will complete functional transfers at a CGA level.  (Progressing)       Start:  01/08/25    Expected End:  01/22/25            Patient will complete lower body dressing at a CGA level.  (Progressing)       Start:  01/08/25    Expected End:  01/22/25            Patient will demonstrate fair/fair + dynamic standing balance during functional tasks.  (Progressing)       Start:  01/08/25    Expected End:  01/22/25            Patient will be independent with B/L UE coordination HEP.  (Progressing)       Start:  01/08/25    Expected End:  01/22/25

## 2025-01-08 NOTE — PROGRESS NOTES
Physical Therapy    Physical Therapy Evaluation    Patient Name: Carmella Frank  MRN: [id number]  Today's Date: 1/8/2025   Time Calculation  Start Time: 1059  Stop Time: 1115  Time Calculation (min): 16 min  09/09-A    Assessment/Plan   PT Assessment  PT Assessment Results: Decreased endurance, Impaired balance, Decreased mobility, Impaired judgement, Decreased safety awareness (impulsive, decreased insight into her deficits)  Rehab Prognosis: Good  Barriers to Discharge Home: Caregiver assistance, Physical needs, Cognition needs  Caregiver Assistance: Caregiver assistance needed per identified barriers - however, level of patient's required assistance exceeds assistance available at home  Cognition Needs: Insight of patient limited regarding functional ability/needs  Physical Needs: Stair navigation into home limited by function/safety, Ambulating household distances limited by function/safety, High falls risk due to function or environment  Evaluation/Treatment Tolerance: Patient limited by fatigue  Medical Staff Made Aware: Yes  Strengths: Support of Caregivers, Living arrangement secure  Barriers to Participation: Comorbidities, Housing layout  End of Session Communication: Bedside nurse  Assessment Comment: Pt. appears very unsteady with transfers and amb. Pt. demonstrates impulsivness, decreased safety awareness and lacks insight into her deficits. Recommend continued therapy in acute care followed by continued therapy at a high intensity level following D/C.  End of Session Patient Position: Up in chair, Alarm on (Staff in room, Call light within reach)  IP OR SWING BED PT PLAN  Inpatient or Swing Bed: Inpatient  PT Plan  Treatment/Interventions: Bed mobility, Transfer training, Gait training, Balance training, Strengthening, Endurance training, Therapeutic exercise  PT Plan: Ongoing PT  PT Frequency: 3 times per week  PT Discharge Recommendations: High intensity level of continued care  Equipment Recommended  upon Discharge:  (TBD)  PT Recommended Transfer Status: Assist x1, Assistive device  PT - OK to Discharge: Yes (Physical Therapy eval completed per MD requisition. P.T. recommendations as outlined above. Recommend D/C from acute care when medically appropriate as deemed by medical staff.)    Subjective     Current Problem:  1. Altered mental status, unspecified altered mental status type  EEG      2. Alcoholic intoxication with complication (CMS-HCC)        3. Abnormal CT of brain  CANCELED: Cardiac Device Check - Rad Onc    CANCELED: Cardiac Device Check - Rad Onc    CANCELED: Cardiac Device Check - Rad Onc    CANCELED: Cardiac Device Check - Rad Onc      4. Pacemaker  CANCELED: Cardiac Device Check - Rad Onc    CANCELED: Cardiac Device Check - Rad Onc    CANCELED: Cardiac Device Check - Rad Onc    CANCELED: Cardiac Device Check - Rad Onc        Patient Active Problem List   Diagnosis    Wrist tendonitis    Lightheadedness    Hypertension    Hyperactivity    Finger pain    Hand contusion    Anxiety    Recurrent falls    Altered mental status, unspecified altered mental status type       General Visit Information:  General  Reason for Referral: impaired mobility  Referred By: CARMELINA Solano CNP (PT/OT 1/7)  Past Medical History Relevant to Rehab: includes: depression, ETOH  Family/Caregiver Present: Yes  Caregiver Feedback: Dtr present and supportive  Co-Treatment: OT  Co-Treatment Reason: Pt. seen with OT to maximize safety and function  Prior to Session Communication: Bedside nurse  Patient Position Received: Bed, 4 rail up, Alarm off, caregiver present (staff in room, dtr in room)  Preferred Learning Style: auditory, verbal  General Comment: Pt. is a 65yo who presented to [facility] [place] on 1/6/2025 with behavorial changes, altered mental status and forgetfulness. ETOH = 339, Lacate = 2.6. Per EMR, Pt. fell in 12/24/2024 hitting her head on a table. Pt. went to [facility] at that time was (-). Pt. was D/C'd home with  concussion. Pt. [place] [facility] [place]25 with about symptoms.     Head CT (1/6): No acute intracranial hemorrhage was identified. Findings favoring bilateral subdural collections right-greater-than-left potentially late subacute or chronic subdural hematoma with displacement of the bridging veins with minimal leftward midline shift.     Head CT (1/7): No interval change. No acute intracranial hemorrhage, intra-axial or extra-axial. 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     Per EMR, Pt. fell in hospital room on 1/7/2025 and was transferred to SICU. Hgb (1/8) 11.1 trending down. Admitting   Dx: altered mental status, ETOH intoxication, abnormal CT    Home Living:  Home Living  Home Living Comments: Pt. lives with spouse and dtr in a 2 level house with 4 KEVEN with HR fro front or 3 KEVEN wihtout HR from garage. Bed/full bath on 2nd floor with tub shower with a grab bar but no seat. 1.2 bathroom and laundry on 1st floor.    Prior Level of Function:  Prior Function Per Pt/Caregiver Report  Prior Function Comments: Pt. amb without AD and was I with ADLs and IADLs PTA. Pt. denied any other falls (than the 2 decribed above) in last 3 months. Pt. drove PTA.    Precautions:  Precautions  Medical Precautions:  (CIWA; Activity order: OOB with A)  Precautions Comment: Per EMR: High fall risk    Vital Signs:  Vital Signs  Heart Rate: 76  SpO2: 99 % (RA)  Objective     Pain:  Pain Assessment  Pain Assessment: 0-10  0-10 (Numeric) Pain Score: 0 - No pain    Cognition:  Cognition  Orientation Level: Disoriented to situation  Problem Solving: Exceptions to WFL  Complex Functional Tasks: Impaired  Safety/Judgement: Exceptions to WFL  Complex Functional Tasks: Moderate  Insight: Moderate  Impulsive: Moderately  Planning: Reduced planning skills  Organization: Moderately disorganized    General Assessments:  General  Observation  General Observation: Tele, Pulse ox, BP cuff   Activity Tolerance  Endurance: Decreased tolerance for upright activites           Coordination  Movements are Fluid and Coordinated: No  Finger to Nose: Impaired (bilaterally)  Heel to [place]: Intact     Dynamic Sitting Balance  Dynamic Sitting-Comments: Good static and dynamic sitting balance  Dynamic Standing Balance  Dynamic Standing-Comments: Poor static and dynamic standing balance    Functional Assessments:     Bed Mobility  Bed Mobility: Yes  Bed Mobility 1  Bed Mobility 1: Supine to sitting  Level of Assistance 1: Minimum assistance  Bed Mobility Comments 1: A to maneuver LEs over EOB and to elevate trunk from bed  Transfers  Transfer: Yes  Transfer 1  Technique 1: Sit to stand  Transfer Device 1:  (No AD)  Transfer Level of Assistance 1: Moderate assistance  Trials/Comments 1: A for lifting, steadying.  Transfers 2  Technique 2: Stand to sit  Transfer Device 2:  (FWW)  Transfer Level of Assistance 2: Minimum assistance  Trials/Comments 2: A to control descent. VC's for hand placement.  Ambulation/Gait Training  Ambulation/Gait Training Performed: Yes  Ambulation/Gait Training 1  Surface 1: Level tile  Device 1: No device  Assistance 1: Moderate assistance  Quality of Gait 1:  (slow, very unsteeady gait wtih very short step lengths and decreased trunk control. Pt. reaching for objects to hold onto)  Comments/Distance (ft) 1: 7'; A for balance, safety  Ambulation/Gait Training 2  Surface 2: Level tile  Device 2: Rolling walker  Assistance 2: Minimum assistance  Quality of Gait 2: Narrow base of support (steadier step-to gait pattern with slighly larger step lengts, imprived trunk control but still required A to maintain her balance)  Comments/Distance (ft) 2: 15'; A for balance, safety, maneuvering FWW  Stairs  Stairs: No       Extremity/Trunk Assessments:  RUE   RUE : Within Functional Limits  LUE   LUE: Within Functional Limits  RLE   RLE : Within  Functional Limits  LLE   LLE : Within Functional Limits    Outcome Measures:     [facility] Basic Mobility  Turning from your back to your side while in a flat bed without using bedrails: A little  Moving from lying on your back to sitting on the side of a flat bed without using bedrails: A little  Moving to and from bed to chair (including a wheelchair): A lot  Standing up from a chair using your arms (e.g. wheelchair or bedside chair): A lot  To walk in hospital room: A lot  Climbing 3-5 steps with railing: Total  Basic Mobility - Total Score: 13                                                             Goals:  Encounter Problems       Encounter Problems (Active)       PT Problem       Pt. will transfer supine/sit with SBA (Progressing)       Start:  01/08/25    Expected End:  01/22/25            Pt. will transfer sit/stand with safest and least restrictive assistive device with CGA (Progressing)       Start:  01/08/25    Expected End:  01/22/25            Pt.will ambulate 50' with safest and least restrictive assistive device with CGA (Progressing)       Start:  01/08/25    Expected End:  01/22/25            Pt. will amb up/down 4 steps with HR and cane with MIN A x 1 (Not Progressing)       Start:  01/08/25    Expected End:  01/22/25            Pt. will perform 2 x 15 B LE AROM exercises  (Not Progressing)       Start:  01/08/25    Expected End:  01/22/25                 Education Documentation  Mobility Training, taught by Laith Ybarra, PT at 1/8/2025  2:08 PM.  Learner: Family, Patient  Readiness: Acceptance  Method: Explanation  Response: Verbalizes Understanding, Needs Reinforcement  Comment: Role of PT, transfers, amb, safety, PT POC

## 2025-01-09 LAB
ALBUMIN SERPL BCP-MCNC: 3.6 G/DL (ref 3.4–5)
ALP SERPL-CCNC: 95 U/L (ref 33–136)
ALT SERPL W P-5'-P-CCNC: 60 U/L (ref 7–45)
ANION GAP SERPL CALC-SCNC: 12 MMOL/L (ref 10–20)
AST SERPL W P-5'-P-CCNC: 73 U/L (ref 9–39)
BILIRUB SERPL-MCNC: 1.3 MG/DL (ref 0–1.2)
BUN SERPL-MCNC: 10 MG/DL (ref 6–23)
CALCIUM SERPL-MCNC: 8.7 MG/DL (ref 8.6–10.3)
CHLORIDE SERPL-SCNC: 101 MMOL/L (ref 98–107)
CO2 SERPL-SCNC: 25 MMOL/L (ref 21–32)
CREAT SERPL-MCNC: 0.56 MG/DL (ref 0.5–1.05)
EGFRCR SERPLBLD CKD-EPI 2021: >90 ML/MIN/1.73M*2
ERYTHROCYTE [DISTWIDTH] IN BLOOD BY AUTOMATED COUNT: 12.3 % (ref 11.5–14.5)
GLUCOSE SERPL-MCNC: 98 MG/DL (ref 74–99)
HCT VFR BLD AUTO: 32.9 % (ref 36–46)
HGB BLD-MCNC: 11.7 G/DL (ref 12–16)
MAGNESIUM SERPL-MCNC: 1.75 MG/DL (ref 1.6–2.4)
MCH RBC QN AUTO: 34.3 PG (ref 26–34)
MCHC RBC AUTO-ENTMCNC: 35.6 G/DL (ref 32–36)
MCV RBC AUTO: 97 FL (ref 80–100)
METHYLMALONATE SERPL-SCNC: <0.1 UMOL/L (ref 0–0.4)
NRBC BLD-RTO: 0 /100 WBCS (ref 0–0)
PLATELET # BLD AUTO: 142 X10*3/UL (ref 150–450)
POTASSIUM SERPL-SCNC: 3.4 MMOL/L (ref 3.5–5.3)
PROT SERPL-MCNC: 6.3 G/DL (ref 6.4–8.2)
RBC # BLD AUTO: 3.41 X10*6/UL (ref 4–5.2)
SODIUM SERPL-SCNC: 135 MMOL/L (ref 136–145)
WBC # BLD AUTO: 4.1 X10*3/UL (ref 4.4–11.3)

## 2025-01-09 PROCEDURE — 2500000001 HC RX 250 WO HCPCS SELF ADMINISTERED DRUGS (ALT 637 FOR MEDICARE OP)

## 2025-01-09 PROCEDURE — 36415 COLL VENOUS BLD VENIPUNCTURE: CPT | Performed by: NURSE PRACTITIONER

## 2025-01-09 PROCEDURE — 83735 ASSAY OF MAGNESIUM: CPT | Performed by: NURSE PRACTITIONER

## 2025-01-09 PROCEDURE — 2500000002 HC RX 250 W HCPCS SELF ADMINISTERED DRUGS (ALT 637 FOR MEDICARE OP, ALT 636 FOR OP/ED)

## 2025-01-09 PROCEDURE — 99233 SBSQ HOSP IP/OBS HIGH 50: CPT | Performed by: NURSE PRACTITIONER

## 2025-01-09 PROCEDURE — 2500000001 HC RX 250 WO HCPCS SELF ADMINISTERED DRUGS (ALT 637 FOR MEDICARE OP): Performed by: NURSE PRACTITIONER

## 2025-01-09 PROCEDURE — 2500000004 HC RX 250 GENERAL PHARMACY W/ HCPCS (ALT 636 FOR OP/ED): Performed by: NURSE PRACTITIONER

## 2025-01-09 PROCEDURE — 85027 COMPLETE CBC AUTOMATED: CPT | Performed by: NURSE PRACTITIONER

## 2025-01-09 PROCEDURE — 2500000004 HC RX 250 GENERAL PHARMACY W/ HCPCS (ALT 636 FOR OP/ED)

## 2025-01-09 PROCEDURE — 1210000001 HC SEMI-PRIVATE ROOM DAILY

## 2025-01-09 PROCEDURE — 80053 COMPREHEN METABOLIC PANEL: CPT | Performed by: NURSE PRACTITIONER

## 2025-01-09 PROCEDURE — 2500000004 HC RX 250 GENERAL PHARMACY W/ HCPCS (ALT 636 FOR OP/ED): Performed by: EMERGENCY MEDICINE

## 2025-01-09 PROCEDURE — 97530 THERAPEUTIC ACTIVITIES: CPT | Mod: GP,CQ

## 2025-01-09 PROCEDURE — 97535 SELF CARE MNGMENT TRAINING: CPT | Mod: GO

## 2025-01-09 PROCEDURE — 99231 SBSQ HOSP IP/OBS SF/LOW 25: CPT

## 2025-01-09 RX ORDER — LORAZEPAM 0.5 MG/1
0.5 TABLET ORAL EVERY 6 HOURS PRN
Status: DISCONTINUED | OUTPATIENT
Start: 2025-01-09 | End: 2025-01-10 | Stop reason: HOSPADM

## 2025-01-09 RX ORDER — POTASSIUM CHLORIDE 20 MEQ/1
40 TABLET, EXTENDED RELEASE ORAL ONCE
Status: COMPLETED | OUTPATIENT
Start: 2025-01-09 | End: 2025-01-09

## 2025-01-09 RX ORDER — MAGNESIUM SULFATE HEPTAHYDRATE 40 MG/ML
2 INJECTION, SOLUTION INTRAVENOUS ONCE
Status: COMPLETED | OUTPATIENT
Start: 2025-01-09 | End: 2025-01-09

## 2025-01-09 RX ADMIN — FAMOTIDINE 20 MG: 20 TABLET, FILM COATED ORAL at 21:17

## 2025-01-09 RX ADMIN — FAMOTIDINE 20 MG: 20 TABLET, FILM COATED ORAL at 08:01

## 2025-01-09 RX ADMIN — THIAMINE HYDROCHLORIDE 500 MG: 100 INJECTION, SOLUTION INTRAMUSCULAR; INTRAVENOUS at 00:08

## 2025-01-09 RX ADMIN — THIAMINE HYDROCHLORIDE 500 MG: 100 INJECTION, SOLUTION INTRAMUSCULAR; INTRAVENOUS at 16:00

## 2025-01-09 RX ADMIN — FOLIC ACID 1 MG: 1 TABLET ORAL at 08:01

## 2025-01-09 RX ADMIN — Medication 1 TABLET: at 08:01

## 2025-01-09 RX ADMIN — THIAMINE HYDROCHLORIDE 500 MG: 100 INJECTION, SOLUTION INTRAMUSCULAR; INTRAVENOUS at 08:02

## 2025-01-09 RX ADMIN — MAGNESIUM SULFATE HEPTAHYDRATE 2 G: 40 INJECTION, SOLUTION INTRAVENOUS at 06:21

## 2025-01-09 RX ADMIN — POTASSIUM CHLORIDE 40 MEQ: 1500 TABLET, EXTENDED RELEASE ORAL at 06:21

## 2025-01-09 ASSESSMENT — PAIN SCALES - GENERAL
PAINLEVEL_OUTOF10: 0 - NO PAIN

## 2025-01-09 ASSESSMENT — COGNITIVE AND FUNCTIONAL STATUS - GENERAL
WALKING IN HOSPITAL ROOM: A LITTLE
HELP NEEDED FOR BATHING: A LITTLE
TOILETING: A LITTLE
CLIMB 3 TO 5 STEPS WITH RAILING: A LOT
TURNING FROM BACK TO SIDE WHILE IN FLAT BAD: A LITTLE
DRESSING REGULAR UPPER BODY CLOTHING: A LITTLE
EATING MEALS: A LITTLE
PERSONAL GROOMING: A LITTLE
WALKING IN HOSPITAL ROOM: A LITTLE
STANDING UP FROM CHAIR USING ARMS: A LITTLE
HELP NEEDED FOR BATHING: A LITTLE
MOBILITY SCORE: 18
DAILY ACTIVITIY SCORE: 18
DRESSING REGULAR LOWER BODY CLOTHING: A LITTLE
WALKING IN HOSPITAL ROOM: A LITTLE
MOBILITY SCORE: 18
PERSONAL GROOMING: A LITTLE
MOVING TO AND FROM BED TO CHAIR: A LITTLE
MOBILITY SCORE: 19
DRESSING REGULAR UPPER BODY CLOTHING: A LITTLE
DRESSING REGULAR LOWER BODY CLOTHING: A LITTLE
TOILETING: A LITTLE
DAILY ACTIVITIY SCORE: 19
HELP NEEDED FOR BATHING: A LITTLE
DRESSING REGULAR LOWER BODY CLOTHING: A LITTLE
MOVING TO AND FROM BED TO CHAIR: A LITTLE
PERSONAL GROOMING: A LITTLE
TURNING FROM BACK TO SIDE WHILE IN FLAT BAD: A LITTLE
DAILY ACTIVITIY SCORE: 18
STANDING UP FROM CHAIR USING ARMS: A LITTLE
TOILETING: A LITTLE
EATING MEALS: A LITTLE
CLIMB 3 TO 5 STEPS WITH RAILING: A LOT
CLIMB 3 TO 5 STEPS WITH RAILING: A LOT
DRESSING REGULAR UPPER BODY CLOTHING: A LITTLE
MOVING TO AND FROM BED TO CHAIR: A LITTLE
STANDING UP FROM CHAIR USING ARMS: A LITTLE

## 2025-01-09 ASSESSMENT — LIFESTYLE VARIABLES
NAUSEA AND VOMITING: NO NAUSEA AND NO VOMITING
AUDITORY DISTURBANCES: NOT PRESENT
VISUAL DISTURBANCES: NOT PRESENT
HEADACHE, FULLNESS IN HEAD: NOT PRESENT
AGITATION: NORMAL ACTIVITY
AUDITORY DISTURBANCES: NOT PRESENT
TOTAL SCORE: 5
AUDITORY DISTURBANCES: NOT PRESENT
AGITATION: SOMEWHAT MORE THAN NORMAL ACTIVITY
HEADACHE, FULLNESS IN HEAD: NOT PRESENT
PAROXYSMAL SWEATS: BARELY PERCEPTIBLE SWEATING, PALMS MOIST
BLOOD PRESSURE: 149/78
PAROXYSMAL SWEATS: 2
PAROXYSMAL SWEATS: 2
TREMOR: NO TREMOR
PULSE: 72
ANXIETY: 2
ORIENTATION AND CLOUDING OF SENSORIUM: ORIENTED AND CAN DO SERIAL ADDITIONS
TREMOR: 2
ORIENTATION AND CLOUDING OF SENSORIUM: CANNOT DO SERIAL ADDITIONS OR IS UNCERTAIN ABOUT DATE
VISUAL DISTURBANCES: NOT PRESENT
AUDITORY DISTURBANCES: NOT PRESENT
TREMOR: 2
TREMOR: 2
TOTAL SCORE: 0
NAUSEA AND VOMITING: NO NAUSEA AND NO VOMITING
ORIENTATION AND CLOUDING OF SENSORIUM: CANNOT DO SERIAL ADDITIONS OR IS UNCERTAIN ABOUT DATE
AGITATION: NORMAL ACTIVITY
AUDITORY DISTURBANCES: NOT PRESENT
TOTAL SCORE: 8
TOTAL SCORE: 8
TOTAL SCORE: 0
PAROXYSMAL SWEATS: 2
ANXIETY: NO ANXIETY, AT EASE
HEADACHE, FULLNESS IN HEAD: NOT PRESENT
TREMOR: 2
TOTAL SCORE: 7
NAUSEA AND VOMITING: NO NAUSEA AND NO VOMITING
HEADACHE, FULLNESS IN HEAD: NOT PRESENT
ORIENTATION AND CLOUDING OF SENSORIUM: CANNOT DO SERIAL ADDITIONS OR IS UNCERTAIN ABOUT DATE
ANXIETY: MILDLY ANXIOUS
AUDITORY DISTURBANCES: NOT PRESENT
PAROXYSMAL SWEATS: NO SWEAT VISIBLE
NAUSEA AND VOMITING: NO NAUSEA AND NO VOMITING
ANXIETY: NO ANXIETY, AT EASE
AGITATION: NORMAL ACTIVITY
ANXIETY: 2
TREMOR: 2
AGITATION: SOMEWHAT MORE THAN NORMAL ACTIVITY
AGITATION: NORMAL ACTIVITY
ORIENTATION AND CLOUDING OF SENSORIUM: ORIENTED AND CAN DO SERIAL ADDITIONS
TREMOR: NO TREMOR
HEADACHE, FULLNESS IN HEAD: NOT PRESENT
ANXIETY: 2
VISUAL DISTURBANCES: NOT PRESENT
ORIENTATION AND CLOUDING OF SENSORIUM: CANNOT DO SERIAL ADDITIONS OR IS UNCERTAIN ABOUT DATE
AGITATION: NORMAL ACTIVITY
PAROXYSMAL SWEATS: NO SWEAT VISIBLE
NAUSEA AND VOMITING: NO NAUSEA AND NO VOMITING
HEADACHE, FULLNESS IN HEAD: NOT PRESENT
VISUAL DISTURBANCES: NOT PRESENT
HEADACHE, FULLNESS IN HEAD: NOT PRESENT
PAROXYSMAL SWEATS: 2
NAUSEA AND VOMITING: NO NAUSEA AND NO VOMITING
ANXIETY: 2
NAUSEA AND VOMITING: NO NAUSEA AND NO VOMITING
AUDITORY DISTURBANCES: NOT PRESENT
TOTAL SCORE: 7
VISUAL DISTURBANCES: NOT PRESENT
ORIENTATION AND CLOUDING OF SENSORIUM: CANNOT DO SERIAL ADDITIONS OR IS UNCERTAIN ABOUT DATE
VISUAL DISTURBANCES: NOT PRESENT
VISUAL DISTURBANCES: NOT PRESENT

## 2025-01-09 ASSESSMENT — PAIN - FUNCTIONAL ASSESSMENT
PAIN_FUNCTIONAL_ASSESSMENT: 0-10

## 2025-01-09 ASSESSMENT — ACTIVITIES OF DAILY LIVING (ADL)
HOME_MANAGEMENT_TIME_ENTRY: 15
LACK_OF_TRANSPORTATION: NO

## 2025-01-09 NOTE — PROGRESS NOTES
01/09/25 1116   Discharge Planning   Living Arrangements Spouse/significant other;Children  (spouse and daughter)   Support Systems Spouse/significant other;Children   Assistance Needed none, Ind ADLS and IADLS no AD, drives, retired, multiple recent falls   Type of Residence Private residence  (2 level home + basement)   Number of Stairs to Enter Residence 3   Number of Stairs Within Residence 12   Do you have animals or pets at home? Yes   Type of Animals or Pets 2 Cats   Home or Post Acute Services Post acute facilities (Rehab/SNF/etc)   Type of Post Acute Facility Services Rehab;Skilled nursing   Expected Discharge Disposition Short Term A   Does the patient need discharge transport arranged? Yes   RoundTrip coordination needed? Yes   Has discharge transport been arranged? No   Financial Resource Strain   How hard is it for you to pay for the very basics like food, housing, medical care, and heating? Not hard   Housing Stability   In the last 12 months, was there a time when you were not able to pay the mortgage or rent on time? N   In the past 12 months, how many times have you moved where you were living? 0   At any time in the past 12 months, were you homeless or living in a shelter (including now)? N   Transportation Needs   In the past 12 months, has lack of transportation kept you from medical appointments or from getting medications? no   In the past 12 months, has lack of transportation kept you from meetings, work, or from getting things needed for daily living? No   Patient Choice   Provider Choice list and CMS website (https://medicare.gov/care-compare#search) for post-acute Quality and Resource Measure Data were provided and reviewed with: Patient;Family   Patient / Family choosing to utilize agency / facility established prior to hospitalization No   Stroke Family Assessment   Stroke Family Assessment Needed No   Intensity of Service   Intensity of Service 0-30 min     Notified by SICU CNP that  ICU Intensivist is recommending acute rehab, and feels pt would benefit from TBI rehab at [facility]. liaison number which is [phone]. Called and spoke with [facility] Liawayne Bynum who requests referral be sent in [facility] to review. Met with pt and her  to discuss DC planning, CNP joined conversation to explain pt clinicals and reasoning for this type of rehab to benefit pt. AMPA scores are PT (13) OT (16) recommending continued therapy at high level/intensity. After conversation, pt and spouse are agreeable to [facility] if accepted and approved by insurance. Pt/spouse do admit to daily bottle of wine with dinner, sometimes 2 if daughter is there. Pt/spouse are open to speaking with [facility] coordinator for resources, and spouse states they are not drinking anymore. Spouse also inquiring about POA paperwork JACQUELINE Crandall notified. CT Team will monitor case for progression and DC planning.

## 2025-01-09 NOTE — CARE PLAN
The patient's goals for the shift include  go home    The clinical goals for the shift include Patient will remain free from additional injury throught this shift.    Over the shift, the patient did not make progress toward the following goals. Barriers to progression include impulsive and unsteady gait.

## 2025-01-09 NOTE — PROGRESS NOTES
"Physical Therapy    Physical Therapy Treatment    Patient Name: Carmella Frank  MRN: [id number]  Today's Date: 1/9/2025  Time Calculation  Start Time: 1002  Stop Time: 1026  Time Calculation (min): 24 min     09/09-A    Assessment/Plan   End of Session Communication: Bedside nurse, PCT/NA/CTA    Assessment Comment: Improved balance and transfers this date. She continues to be impulsive and lacks insight into her deficits. She would benefit from continued PT.  End of Session Patient Position:  (Patient sitting up in chair.  Call light/tray in reach. 1:1 sitter at bedside.)    PT Plan  Inpatient/Swing Bed or Outpatient: Inpatient  Treatment/Interventions: Bed mobility, Transfer training, Gait training, Balance training, Strengthening, Endurance training, Therapeutic exercise  PT Plan: Ongoing PT  PT Frequency: 3 times per week  PT Discharge Recommendations: High intensity level of continued care  Equipment Recommended upon Discharge:  (TBD) PT Recommended Transfer Status: Assist x1, Assistive device    General Visit Information:   PT  Visit  PT Received On: 01/09/25    General  Prior to Session Communication:  (Cleared by RN for PT)  Patient Position Received:  (Patient presents sitting up in chair. Eager and agreeable to participate with PT)    General Comment:  (Patient admitted with alterned mental status; ETOH intoxication)    General Observations:   General Observation:  (tele; 1:1 sitter)    Subjective  \"I do yoga, so I was stretching earlier\"    Precautions:  Precautions  Medical Precautions: Fall precautions    Pain:  Pain Assessment  Pain Assessment: 0-10  0-10 (Numeric) Pain Score: 0 - No pain    Cognition:  Cognition  Overall Cognitive Status:  (pleasant and cooperative; motivated towards goals)  Safety/Judgement: Exceptions to WFL  Insight: Moderate      Balance:   Dynamic Standing Balance  Dynamic Standing-Balance:  (Fair dynamic stand)      Activity Tolerance:  Activity Tolerance  Endurance: Endurance does " not limit participation in activity    Treatments:  Therapeutic Exercise  Therapeutic Exercise Performed:  (seated LE ther-ex: AP, LAQ, hip add (pillow squeeze) x10 **standing ther-ex: heel raises, marching, hip abd x10)     Balance/Neuromuscular Re-Education  Balance/Neuromuscular Re-Education Activity Performed:  (Patient performed multilevel standing reaching with alternating UE's and single UE support on ww.  SBAx1, no LOB.)    Bed Mobility  Bed Mobility:  (supine<-->sit: Independent  Bed flat to mimic home set up.  Patient crawls head first on all fours into bed and states that how she's always done it.  Transfers back out of bed using log roll technique. No use of bed rail.)    Ambulation/Gait Training  Ambulation/Gait Training Performed:  (Patient amb 130' with ww and CGAx1.  Slow rick, tends to veer towards right side. Difficulty navigating ww around obstacles.  Ran into objects in givens x5 on her right side. She amb an additional 20' without AD but with some furniture walking.)    Transfers  Transfer:  (sit<-->stand: SBAx1   Three stands performed from chair level.  Instructions needed for hand placement.)             Outcome Measures:   [facility] Basic Mobility  Turning from your back to your side while in a flat bed without using bedrails: None  Moving from lying on your back to sitting on the side of a flat bed without using bedrails: None  Moving to and from bed to chair (including a wheelchair): A little  Standing up from a chair using your arms (e.g. wheelchair or bedside chair): A little  To walk in hospital room: A little  Climbing 3-5 steps with railing: A lot  Basic Mobility - Total Score: 19            Education Documentation  Mobility Training, taught by Nicolette Spence PTA at 1/9/2025 10:29 AM.  Learner: Patient  Readiness: Acceptance  Method: Explanation, Demonstration  Response: Needs Reinforcement    Education Comments  Individual(s) Educated: Patient  Education Provided:  (Educated in  transfers, gait, LE ther-ex and balance)  Patient Response to Education:  (Receptive to education)    Encounter Problems       Encounter Problems (Active)       PT Problem       Pt. will transfer supine/sit with SBA (Progressing)       Start:  01/08/25    Expected End:  01/22/25            Pt. will transfer sit/stand with safest and least restrictive assistive device with CGA (Progressing)       Start:  01/08/25    Expected End:  01/22/25            Pt.will ambulate 50' with safest and least restrictive assistive device with CGA (Progressing)       Start:  01/08/25    Expected End:  01/22/25            Pt. will amb up/down 4 steps with HR and cane with MIN A x 1 (Not Progressing)       Start:  01/08/25    Expected End:  01/22/25            Pt. will perform 2 x 15 B LE AROM exercises  (Progressing)       Start:  01/08/25    Expected End:  01/22/25

## 2025-01-09 NOTE — PROGRESS NOTES
[facility] Critical Care Medicine       Date:  1/9/2025  Patient:  Carmella Frank  YOB: 1960  MRN:  [id number]   Admit Date:  1/6/2025  ========================================================================================================    Chief Complaint   Patient presents with    Fall     Pt had fall on cristofer zeeshan, was seen at [facility], they did a scan and ruled she had a concussion. Since the fall, pt family member states she is extremely forgetful, sleeping a lot, and not acting normal. PCP sent her here due to thinking she has a slow bleed.          History of Present Illness:  Carmella Frank is a 64 y.o. year old female patient with Past Medical History of HTN and daily ETOH consumption who [place] [facility] [place] of lethargy and increased confusion. Per her family that was present in ER, patient fell on 12/24/24 and hit her head on a metal coffee table, +LOC, presented to [facility] ER, CT imaging at the time was negative and was diagnosed with a concussion. Yesterday 1/6/25 she was evaluated by her PCP for continued confusion and hallucinations, given recent fall with head trauma her PCP advised evaluation at ER.     She was admitted to [facility] 1/6/24, initiated on CIWA with Ativan. Today had unwitnessed fall, was at bedside and found on her knee. No tenderness/swelling noted. When being assisted back to bed, became agitated and aggressive, pulled out her PIV. ICU consulted for concern of worsening withdrawal symptoms. Family notes that since her fall, she has had gait imbalances, complaints of headache, hallucinations and short term memory loss.       Interval ICU Events:  1//7: Admitted to ICU, phenobarb loading. Continue to monitor for increasing agitation. Trauma surgery evaluation for SDH. EEG pending   1/8: EEG negative epileptiform discharges. AAO x3, easily redirected, mild tremors, denies a/v hallucinations.   1/9: Up in chair, decrease in tremors. Improvement in mentation  but still impulsive per RN staff and requires redirection. Completed Phenobarb 10 mg/ kg. MRI brain: decrease in size right subdural fluid collection, resolution of left subdural fluid collection, generalized diffuse smooth dural thickening likely reactive, no intracranial mass, no acute ischemic infarct/ mass effect, new acute ICH, mild burden of stuprtentorial chronic small vessel ischemic disease    Medical History:  History reviewed. No pertinent past medical history.  Past Surgical History:   Procedure Laterality Date    OTHER SURGICAL HISTORY  10/19/2021    No history of surgery     No medications prior to admission.     Patient has no known allergies.  Social History     Tobacco Use    Smoking status: Never    Smokeless tobacco: Never   Vaping Use    Vaping status: Never Used     No family history on file.    Review of Systems:  14 point review of systems was completed and negative except for those specially mention in my HPI    Physical Exam:    Heart Rate:  []   Temp:  [36.1 °C (97 °F)-37 °C (98.6 °F)]   Resp:  [14-43]   BP: (110-177)/(65-94)   Weight:  [63.1 kg (139 lb 1.8 oz)]   SpO2:  [95 %-99 %]     Physical Exam  Vitals and nursing note reviewed.   Constitutional:       Appearance: She is normal weight.   HENT:      Mouth/Throat:      Mouth: Mucous membranes are moist.   Eyes:      Extraocular Movements: Extraocular movements intact.      Conjunctiva/sclera: Conjunctivae normal.      Pupils: Pupils are equal, round, and reactive to light.   Cardiovascular:      Rate and Rhythm: Normal rate and regular rhythm.      Pulses: Normal pulses.      Heart sounds: Normal heart sounds.   Pulmonary:      Effort: Pulmonary effort is normal.      Breath sounds: Normal breath sounds.   Abdominal:      General: Abdomen is flat. There is no distension.      Palpations: Abdomen is soft.      Tenderness: There is no abdominal tenderness.   Musculoskeletal:      Right lower leg: No edema.      Left lower leg: No  edema.   Skin:     General: Skin is warm.      Capillary Refill: Capillary refill takes less than 2 seconds.   Neurological:      Mental Status: She is alert and oriented to person, place, and time.   Psychiatric:         Judgment: Judgment is impulsive.         Objective:    I have reviewed all medications, laboratory results, and imaging pertinent for today's encounter    Assessment/Plan:    I am currently managing this critically ill patient for the following problems:    Neuro/Psych/Pain Ctrl/Sedation:  Alcohol withdrawal daily wine, 2-3 glasses/day consistently for >1 year, no known hx DT  Chronic subdural hematoma [facility] NS: follow up with NS in 2 weeks  Alcohol-[id number]  Phenobarb, received 10mg/ kg load  B12 normal, MMA pending   EEG, negative  MRI brain, decrease in size of right subdural and resolved on left  [facility] ICU  Delirium precautions  Neurology following  Social service, Provide alcohol treatment resources  DVTp per neurology    Respiratory/ENT:  No active issues  O2 supplementation as needed to maintain SpO2 >92%    Cardiovascular:  Hx HTN  No home meds  Maintain MAP >65    GI:  Transaminitis improving  Regular diet  Daily CMP    Renal/Volume Status (Intra & Extravascular):  No active issues  Monitor UOP    Endocrine  Goal BG <180  POCT as needed    Infectious Disease:  No concern for systemic infection  Monitor for SIRS  Resp viral panel negative    Heme/Onc:  No active issues  Monitor CBC    OBGYN/MSK:  Mechanical fall  No injuries noted  Falls precautions  Sitter at bedside  PT/ OT  ACS/ trauma signed off    Ethics/Code Status:  Full Code  Will need acute rehab on discharge and would benefit from TBI rehab   [facility] liaison [phone]  Discharge planning    :  DVT Prophylaxis: hold, SDH  GI Prophylaxis: Pepcid  Bowel Regimen: None  Diet: Regular  CVC: None  [place]: None  [place]: None  Restraints: None  Dispo: transfer    35 minutes spent in preparing to see patient (I.e. review of  medical records), evaluation of diagnostics (I.e. labs, imaging, etc.), documentation, discussing plan of care with patient/ family/ caregiver, and/ or coordination of care with multidisciplinary team. Time does not include completion of procedure time.      Plan discussed with Dr. Brent Stock, APRN-CNP

## 2025-01-09 NOTE — PROGRESS NOTES
"Carmella Frank is a 64 y.o. female on day 2 of admission presenting with Recurrent falls.      Subjective   Pt. In chair doing her make up. We discussed MRI brain, decreased pleural fluid on right with resolution on left side. Her mentation remains stable and she will transfer to floor soon.  Review of systems are negative unless otherwise specified in HPI.        Objective     Last Recorded Vitals  Blood pressure 149/78, pulse 72, temperature 36.6 °C (97.9 °F), temperature source Temporal, resp. rate 16, height 1.651 m (5' 5\"), weight 63.1 kg (139 lb 1.8 oz), SpO2 96%.        Relevant Results  Results for orders placed or performed during the hospital encounter of 01/06/25 (from the past 96 hours)   ECG 12 lead   Result Value Ref Range    Ventricular Rate 87 BPM    Atrial Rate 87 BPM    VA Interval 120 ms    QRS Duration 70 ms    QT Interval 380 ms    QTC Calculation(Bazett) 457 ms    P Axis 50 degrees    R Axis 39 degrees    T Axis -3 degrees    QRS Count 15 beats    Q Onset 224 ms    P Onset 164 ms    P Offset 211 ms    T Offset 414 ms    QTC Fredericia 430 ms   Comprehensive metabolic panel   Result Value Ref Range    Glucose 105 (H) 74 - 99 mg/dL    Sodium 141 136 - 145 mmol/L    Potassium 4.0 3.5 - 5.3 mmol/L    Chloride 104 98 - 107 mmol/L    Bicarbonate 26 21 - 32 mmol/L    Anion Gap 15 10 - 20 mmol/L    Urea Nitrogen 9 6 - 23 mg/dL    Creatinine 0.53 0.50 - 1.05 mg/dL    eGFR >90 >60 mL/min/1.73m*2    Calcium 8.6 8.6 - 10.3 mg/dL    Albumin 3.8 3.4 - 5.0 g/dL    Alkaline Phosphatase 111 33 - 136 U/L    Total Protein 7.1 6.4 - 8.2 g/dL     (H) 9 - 39 U/L    Bilirubin, Total 0.6 0.0 - 1.2 mg/dL     (H) 7 - 45 U/L   Lactate   Result Value Ref Range    Lactate 2.7 (H) 0.4 - 2.0 mmol/L   Blood Gas Venous Full Panel   Result Value Ref Range    POCT pH, Venous 7.44 (H) 7.33 - 7.43 pH    POCT pCO2, Venous 39 (L) 41 - 51 mm Hg    POCT pO2, Venous 45 35 - 45 mm Hg    POCT SO2, Venous 72 45 - 75 %    POCT " Oxy Hemoglobin, Venous 70.0 45.0 - 75.0 %    POCT Hematocrit Calculated, Venous 49.0 (H) 36.0 - 46.0 %    POCT Sodium, Venous 139 136 - 145 mmol/L    POCT Potassium, Venous 4.4 3.5 - 5.3 mmol/L    POCT Chloride, Venous 104 98 - 107 mmol/L    POCT Ionized Calicum, Venous 0.95 (L) 1.10 - 1.33 mmol/L    POCT Glucose, Venous 106 (H) 74 - 99 mg/dL    POCT Lactate, Venous 3.0 (H) 0.4 - 2.0 mmol/L    POCT Base Excess, Venous 2.3 -2.0 - 3.0 mmol/L    POCT HCO3 Calculated, Venous 26.5 (H) 22.0 - 26.0 mmol/L    POCT Hemoglobin, Venous 16.3 (H) 12.0 - 16.0 g/dL    POCT Anion Gap, Venous 13.0 10.0 - 25.0 mmol/L    Patient Temperature      FiO2 21 %   Protime-INR   Result Value Ref Range    Protime 11.1 9.8 - 12.8 seconds    INR 1.0 0.9 - 1.1   CBC and Auto Differential   Result Value Ref Range    WBC 3.0 (L) 4.4 - 11.3 x10*3/uL    nRBC 0.0 0.0 - 0.0 /100 WBCs    RBC 3.95 (L) 4.00 - 5.20 x10*6/uL    Hemoglobin 13.4 12.0 - 16.0 g/dL    Hematocrit 38.5 36.0 - 46.0 %    MCV 98 80 - 100 fL    MCH 33.9 26.0 - 34.0 pg    MCHC 34.8 32.0 - 36.0 g/dL    RDW 12.7 11.5 - 14.5 %    Platelets 183 150 - 450 x10*3/uL    Neutrophils % 47.6 40.0 - 80.0 %    Immature Granulocytes %, Automated 0.7 0.0 - 0.9 %    Lymphocytes % 34.1 13.0 - 44.0 %    Monocytes % 12.2 2.0 - 10.0 %    Eosinophils % 3.4 0.0 - 6.0 %    Basophils % 2.0 0.0 - 2.0 %    Neutrophils Absolute 1.41 1.20 - 7.70 x10*3/uL    Immature Granulocytes Absolute, Automated 0.02 0.00 - 0.70 x10*3/uL    Lymphocytes Absolute 1.01 (L) 1.20 - 4.80 x10*3/uL    Monocytes Absolute 0.36 0.10 - 1.00 x10*3/uL    Eosinophils Absolute 0.10 0.00 - 0.70 x10*3/uL    Basophils Absolute 0.06 0.00 - 0.10 x10*3/uL   Magnesium   Result Value Ref Range    Magnesium 1.72 1.60 - 2.40 mg/dL   B-Type Natriuretic Peptide   Result Value Ref Range    BNP 85 0 - 99 pg/mL   Acute Toxicology Panel, Blood   Result Value Ref Range    Acetaminophen <10.0 10.0 - 30.0 ug/mL    Salicylate  <3 4 - 20 mg/dL    Alcohol 339 (H)  <=10 mg/dL   Ammonia   Result Value Ref Range    Ammonia 37 16 - 53 umol/L   Troponin I, High Sensitivity, Initial   Result Value Ref Range    Troponin I, High Sensitivity 7 0 - 13 ng/L   Influenza A, and B PCR   Result Value Ref Range    Flu A Result Not Detected Not Detected    Flu B Result Not Detected Not Detected   Sars-CoV-2 PCR   Result Value Ref Range    Coronavirus 2019, PCR Not Detected Not Detected   RSV PCR   Result Value Ref Range    RSV PCR Not Detected Not Detected   Drug Screen, Urine   Result Value Ref Range    Amphetamine Screen, Urine Presumptive Negative Presumptive Negative    Barbiturate Screen, Urine Presumptive Negative Presumptive Negative    Benzodiazepines Screen, Urine Presumptive Negative Presumptive Negative    Cannabinoid Screen, Urine Presumptive Negative Presumptive Negative    Cocaine Metabolite Screen, Urine Presumptive Negative Presumptive Negative    Fentanyl Screen, Urine Presumptive Negative Presumptive Negative    Opiate Screen, Urine Presumptive Negative Presumptive Negative    Oxycodone Screen, Urine Presumptive Negative Presumptive Negative    PCP Screen, Urine Presumptive Negative Presumptive Negative    Methadone Screen, Urine Presumptive Negative Presumptive Negative   Urinalysis with Reflex Culture and Microscopic   Result Value Ref Range    Color, Urine Light-Yellow Light-Yellow, Yellow, Dark-Yellow    Appearance, Urine Clear Clear    Specific Gravity, Urine 1.013 1.005 - 1.035    pH, Urine 6.5 5.0, 5.5, 6.0, 6.5, 7.0, 7.5, 8.0    Protein, Urine NEGATIVE NEGATIVE, 10 (TRACE), 20 (TRACE) mg/dL    Glucose, Urine Normal Normal mg/dL    Blood, Urine NEGATIVE NEGATIVE    Ketones, Urine NEGATIVE NEGATIVE mg/dL    Bilirubin, Urine NEGATIVE NEGATIVE    Urobilinogen, Urine Normal Normal mg/dL    Nitrite, Urine NEGATIVE NEGATIVE    Leukocyte Esterase, Urine NEGATIVE NEGATIVE   Extra Urine Gray Tube   Result Value Ref Range    Extra Tube Hold for add-ons.    Troponin, High  Sensitivity, 1 Hour   Result Value Ref Range    Troponin I, High Sensitivity 7 0 - 13 ng/L   Blood Gas Lactic Acid, Venous   Result Value Ref Range    POCT Lactate, Venous 2.6 (H) 0.4 - 2.0 mmol/L   Lactate   Result Value Ref Range    Lactate 1.9 0.4 - 2.0 mmol/L   CBC   Result Value Ref Range    WBC 3.1 (L) 4.4 - 11.3 x10*3/uL    nRBC 0.0 0.0 - 0.0 /100 WBCs    RBC 3.49 (L) 4.00 - 5.20 x10*6/uL    Hemoglobin 11.9 (L) 12.0 - 16.0 g/dL    Hematocrit 34.8 (L) 36.0 - 46.0 %     80 - 100 fL    MCH 34.1 (H) 26.0 - 34.0 pg    MCHC 34.2 32.0 - 36.0 g/dL    RDW 13.0 11.5 - 14.5 %    Platelets 156 150 - 450 x10*3/uL   Comprehensive metabolic panel   Result Value Ref Range    Glucose 110 (H) 74 - 99 mg/dL    Sodium 139 136 - 145 mmol/L    Potassium 4.2 3.5 - 5.3 mmol/L    Chloride 102 98 - 107 mmol/L    Bicarbonate 26 21 - 32 mmol/L    Anion Gap 15 10 - 20 mmol/L    Urea Nitrogen 11 6 - 23 mg/dL    Creatinine 0.64 0.50 - 1.05 mg/dL    eGFR >90 >60 mL/min/1.73m*2    Calcium 8.5 (L) 8.6 - 10.3 mg/dL    Albumin 3.9 3.4 - 5.0 g/dL    Alkaline Phosphatase 88 33 - 136 U/L    Total Protein 6.3 (L) 6.4 - 8.2 g/dL     (H) 9 - 39 U/L    Bilirubin, Total 1.0 0.0 - 1.2 mg/dL    ALT 88 (H) 7 - 45 U/L   TSH with reflex to Free T4 if abnormal   Result Value Ref Range    Thyroid Stimulating Hormone 1.35 0.44 - 3.98 mIU/L   Gamma-Glutamyl Transferase   Result Value Ref Range    GGT 1,049 (H) 5 - 55 U/L   Folate   Result Value Ref Range    Folate, Serum 6.2 >5.0 ng/mL   Vitamin B12   Result Value Ref Range    Vitamin B12 274 211 - 911 pg/mL   Methylmalonic acid, serum   Result Value Ref Range    Methylmalonic Acid, S <0.10 0.00 - 0.40 umol/L   Magnesium   Result Value Ref Range    Magnesium 1.41 (L) 1.60 - 2.40 mg/dL   CBC and Auto Differential   Result Value Ref Range    WBC 3.2 (L) 4.4 - 11.3 x10*3/uL    nRBC 0.0 0.0 - 0.0 /100 WBCs    RBC 3.26 (L) 4.00 - 5.20 x10*6/uL    Hemoglobin 11.1 (L) 12.0 - 16.0 g/dL    Hematocrit 32.4  (L) 36.0 - 46.0 %    MCV 99 80 - 100 fL    MCH 34.0 26.0 - 34.0 pg    MCHC 34.3 32.0 - 36.0 g/dL    RDW 12.5 11.5 - 14.5 %    Platelets 133 (L) 150 - 450 x10*3/uL    Neutrophils % 61.7 40.0 - 80.0 %    Immature Granulocytes %, Automated 0.3 0.0 - 0.9 %    Lymphocytes % 20.7 13.0 - 44.0 %    Monocytes % 14.2 2.0 - 10.0 %    Eosinophils % 2.5 0.0 - 6.0 %    Basophils % 0.6 0.0 - 2.0 %    Neutrophils Absolute 2.00 1.20 - 7.70 x10*3/uL    Immature Granulocytes Absolute, Automated 0.01 0.00 - 0.70 x10*3/uL    Lymphocytes Absolute 0.67 (L) 1.20 - 4.80 x10*3/uL    Monocytes Absolute 0.46 0.10 - 1.00 x10*3/uL    Eosinophils Absolute 0.08 0.00 - 0.70 x10*3/uL    Basophils Absolute 0.02 0.00 - 0.10 x10*3/uL   Comprehensive Metabolic Panel   Result Value Ref Range    Glucose 99 74 - 99 mg/dL    Sodium 133 (L) 136 - 145 mmol/L    Potassium 3.4 (L) 3.5 - 5.3 mmol/L    Chloride 102 98 - 107 mmol/L    Bicarbonate 25 21 - 32 mmol/L    Anion Gap 9 (L) 10 - 20 mmol/L    Urea Nitrogen 9 6 - 23 mg/dL    Creatinine 0.61 0.50 - 1.05 mg/dL    eGFR >90 >60 mL/min/1.73m*2    Calcium 8.8 8.6 - 10.3 mg/dL    Albumin 3.6 3.4 - 5.0 g/dL    Alkaline Phosphatase 87 33 - 136 U/L    Total Protein 6.2 (L) 6.4 - 8.2 g/dL    AST 89 (H) 9 - 39 U/L    Bilirubin, Total 1.3 (H) 0.0 - 1.2 mg/dL    ALT 66 (H) 7 - 45 U/L   Magnesium   Result Value Ref Range    Magnesium 1.75 1.60 - 2.40 mg/dL   CBC   Result Value Ref Range    WBC 4.1 (L) 4.4 - 11.3 x10*3/uL    nRBC 0.0 0.0 - 0.0 /100 WBCs    RBC 3.41 (L) 4.00 - 5.20 x10*6/uL    Hemoglobin 11.7 (L) 12.0 - 16.0 g/dL    Hematocrit 32.9 (L) 36.0 - 46.0 %    MCV 97 80 - 100 fL    MCH 34.3 (H) 26.0 - 34.0 pg    MCHC 35.6 32.0 - 36.0 g/dL    RDW 12.3 11.5 - 14.5 %    Platelets 142 (L) 150 - 450 x10*3/uL   Comprehensive Metabolic Panel   Result Value Ref Range    Glucose 98 74 - 99 mg/dL    Sodium 135 (L) 136 - 145 mmol/L    Potassium 3.4 (L) 3.5 - 5.3 mmol/L    Chloride 101 98 - 107 mmol/L    Bicarbonate 25 21 - 32  mmol/L    Anion Gap 12 10 - 20 mmol/L    Urea Nitrogen 10 6 - 23 mg/dL    Creatinine 0.56 0.50 - 1.05 mg/dL    eGFR >90 >60 mL/min/1.73m*2    Calcium 8.7 8.6 - 10.3 mg/dL    Albumin 3.6 3.4 - 5.0 g/dL    Alkaline Phosphatase 95 33 - 136 U/L    Total Protein 6.3 (L) 6.4 - 8.2 g/dL    AST 73 (H) 9 - 39 U/L    Bilirubin, Total 1.3 (H) 0.0 - 1.2 mg/dL    ALT 60 (H) 7 - 45 U/L   MR brain w and wo IV contrast    Result Date: 1/8/2025  Interpreted By:  Jose Elias Cruz, STUDY: MR BRAIN W AND WO IV CONTRAST;  1/8/2025 6:49 pm   INDICATION: Signs/Symptoms:Encepalopathy, Ataxia.     COMPARISON: CT head without contrast 01/06/2025, 01/07/2025   ACCESSION NUMBER(S): [id number]   ORDERING CLINICIAN: AINSLEY SUH   TECHNIQUE: Multiplanar, multi-sequence images of the brain were obtained before and after the intravenous administration of 13 mL gadolinium.   FINDINGS:   Diffusion weighted images show no evidence of acute ischemic infarct.   The major intracranial flow voids are preserved.   Mild periventricular and subcortical hemispheric T2/FLAIR white matter hyperintensities are most compatible with chronic small vessel ischemic disease.   There is no acute intraparenchymal hemorrhage midline shift, or acute hemorrhage. There has been decrease in size of the right subdural fluid collection now measuring 0.5 cm, previously 0.7 cm (01/07/2025). There is been resolution of the left subdural fluid collection. There is generalized diffuse smooth dural thickening bilaterally, likely reactive. There is no enhancing intracranial mass.   The ventricular size and cerebral volume are age-concordant.   Normal morphology of midline structures. The craniovertebral junction is normal.   The orbits and globes are unremarkable.   The paranasal sinuses show no air-fluid levels or hemorrhage. Mild mucosal thickening in the ethmoid sinuses and maxillary sinuses.   Mild partial opacification of the left mastoid air cells.       1. Interval  decrease size of the right pleural fluid (0.5 cm vs 0.7 cm) collection and interval resolution of the left subdural fluid collection. There is mild smooth dural thickening bilaterally that is likely reactive to the presence of the subdural collections.   2. No acute ischemic infarct, acute mass effect, or new acute intracranial hemorrhage.   3. Mild burden of supratentorial chronic small vessel ischemic disease.   MACRO: None.   Signed by: Jose Elias Cruz 1/8/2025 7:43 PM Dictation workstation:   [id number]   Scheduled medications   Medication Dose Route Frequency    famotidine  20 mg oral BID    folic acid  1 mg oral Daily    multivitamin with minerals  1 tablet oral Daily    thiamine  500 mg intravenous q8h     PRN medications   Medication    acetaminophen    Or    acetaminophen    Or    acetaminophen    ondansetron    Or    ondansetron           NIH Stroke Scale  1A. Level of Consciousness: Alert, Keenly Responsive  1B. Ask Month and Age: Both Questions Right  1C. Blink Eyes & Squeeze Hands: Performs Both Tasks  2. Best Gaze: Normal  3. Visual: No Visual Loss  4. Facial Palsy: Normal Symmetrical Movements  5A. Motor - Left Arm: No Drift  5B. Motor - Right Arm: No Drift  6A. Motor - Left Leg: No Drift  6B. Motor - Right Leg: No Drift  7. Limb Ataxia: Absent  8. Sensory Loss: Normal  9. Best Language: No Aphasia  10. Dysarthria: Normal  11. Extinction and Inattention: No Abnormality  NIH Stroke Scale: 0           Rell Coma Scale  Best Eye Response: Spontaneous  Best Verbal Response: Confused  Best Motor Response: Follows commands  Rell Coma Scale Score: 14                             Assessment/Plan      Assessment & Plan  Recurrent falls    Altered mental status, unspecified altered mental status type    Impression:  Acute change in mental status most likely alcohol intoxication  Chronic alcohol abuse  History of depression  Bilateral subdural hygromas-improving   Delirium tremens  Recommendations:    Frequent neuro checks  Continue with [facility] protocol  MRI brain w/wo is pending  SW consult for chronic alcoholism   Psychiatry/Psychology for underlying depression .     Follow up with Neurology in 4 weeks.    No further needs from neurology; okay for transfer or discharge as per primary team. Please contact if condition changes for re-eval.      I spent 25 minutes in the professional and overall care of this patient.      Ariela Wiseman, CORINA-CNP

## 2025-01-09 NOTE — PROGRESS NOTES
"Occupational Therapy    OT Treatment    Patient Name: Carmella Frank  MRN: [id number]  Department: [facility]  Room: 09/09-A  Today's Date: 1/9/2025  Time Calculation  Start Time: 1058  Stop Time: 1113  Time Calculation (min): 15 min      Assessment:  End of Session Communication: Bedside nurse (1:1 sitter present.  also in the room.)  End of Session Patient Position: Up in chair, Alarm off, not on at start of session     Plan:  Treatment Interventions: ADL retraining, Functional transfer training, Neuromuscular reeducation, Fine motor coordination activities  OT Frequency: 3 times per week  OT Discharge Recommendations: High intensity level of continued care  OT - OK to Discharge: Yes (Once medically appropriate.)  Treatment Interventions: ADL retraining, Functional transfer training, Neuromuscular reeducation, Fine motor coordination activities    Subjective   Previous Visit Info:  OT Last Visit  OT Received On: 01/09/25  General:  General  Reason for Referral: ADLs  Referred By: CARMELINA Solano CNP (PT/OT 1/7)  Past Medical History Relevant to Rehab: includes: depression, ETOH  Family/Caregiver Present: Yes  Caregiver Feedback:  present - supportive.  Prior to Session Communication: Bedside nurse (Cleared for OT tx by RN.)  Patient Position Received: Up in chair, Alarm off, not on at start of session (1:1 sitter present.)  General Comment: Patient seated in the recliner, pleasant and agreeable to OT tx. Patient motivated. Dx: Recurrent falls. \"I'm feeling great today.\"  Precautions:  Medical Precautions: Fall precautions        Pain:  Pain Assessment  Pain Assessment: 0-10  0-10 (Numeric) Pain Score: 0 - No pain    Objective    Cognition:  Cognition  Overall Cognitive Status:  (Pleasant, motivated and cooperative. Decreased safety/insight.)     Activities of Daily Living:    Grooming  Grooming Comments: Patient stood at CGA level without UE support to wash hands.                      Bed Mobility/Transfers: " Transfer 1  Technique 1: Sit to stand, Stand to sit  Trials/Comments 1: Patient completed x3 sit <> stands from the recliner and x1 sit <> stand from the BSC without a device at CGA level.    Functional Mobility:  Functional Mobility  Functional Mobility Performed: Yes  Functional Mobility 1  Comments 1: Patient completed household distance functional mobility throughout the room x2 without a device at CGA level. Patient requires cues to slow down for safety however no LOB noted with mobility.     Standing Balance:  Static Standing Balance  Static Standing-Comment/Number of Minutes: Fair +  Dynamic Standing Balance  Dynamic Standing-Comments: Fair     Therapy/Activity: Therapeutic Activity  Therapeutic Activity Performed:  (Pt stood at CGA level without UE support while opening/closing 5 grooming items (bottles with various lids) to address B/L coordination deficits. Patient able to stabilize each item in the R and L hand and open/close each container with the R and L hand.)     Outcome Measures:[facility] Daily Activity  Putting on and taking off regular lower body clothing: A little  Bathing (including washing, rinsing, drying): A little  Putting on and taking off regular upper body clothing: A little  Toileting, which includes using toilet, bedpan or urinal: A little  Taking care of personal grooming such as brushing teeth: A little  Eating Meals: None  Daily Activity - Total Score: 19    Education Documentation  Body Mechanics, taught by Emily Valiente OT at 1/9/2025 11:38 AM.  Learner: Patient  Readiness: Acceptance  Method: Explanation  Response: Verbalizes Understanding    EDUCATION:     Goals:  Encounter Problems       Encounter Problems (Active)       OT Goals       Patient will complete household distance functional mobility at a [facility] level.  (Progressing)       Start:  01/08/25    Expected End:  01/22/25            Patient will complete functional transfers at a CGA level.  (Progressing)       Start:  01/08/25     Expected End:  01/22/25            Patient will complete lower body dressing at a CGA level.  (Progressing)       Start:  01/08/25    Expected End:  01/22/25            Patient will demonstrate fair/fair + dynamic standing balance during functional tasks.  (Progressing)       Start:  01/08/25    Expected End:  01/22/25            Patient will be independent with B/L UE coordination HEP.  (Progressing)       Start:  01/08/25    Expected End:  01/22/25

## 2025-01-10 VITALS
TEMPERATURE: 95.9 F | HEIGHT: 65 IN | HEART RATE: 69 BPM | WEIGHT: 147.71 LBS | OXYGEN SATURATION: 98 % | BODY MASS INDEX: 24.61 KG/M2 | DIASTOLIC BLOOD PRESSURE: 79 MMHG | RESPIRATION RATE: 17 BRPM | SYSTOLIC BLOOD PRESSURE: 122 MMHG

## 2025-01-10 LAB
ALBUMIN SERPL BCP-MCNC: 3.7 G/DL (ref 3.4–5)
ALP SERPL-CCNC: 86 U/L (ref 33–136)
ALT SERPL W P-5'-P-CCNC: 51 U/L (ref 7–45)
ANION GAP SERPL CALC-SCNC: 14 MMOL/L (ref 10–20)
AST SERPL W P-5'-P-CCNC: 55 U/L (ref 9–39)
BILIRUB SERPL-MCNC: 1.1 MG/DL (ref 0–1.2)
BUN SERPL-MCNC: 12 MG/DL (ref 6–23)
CALCIUM SERPL-MCNC: 9 MG/DL (ref 8.6–10.3)
CHLORIDE SERPL-SCNC: 103 MMOL/L (ref 98–107)
CO2 SERPL-SCNC: 23 MMOL/L (ref 21–32)
CREAT SERPL-MCNC: 0.63 MG/DL (ref 0.5–1.05)
EGFRCR SERPLBLD CKD-EPI 2021: >90 ML/MIN/1.73M*2
ERYTHROCYTE [DISTWIDTH] IN BLOOD BY AUTOMATED COUNT: 12.6 % (ref 11.5–14.5)
GLUCOSE SERPL-MCNC: 107 MG/DL (ref 74–99)
HCT VFR BLD AUTO: 34.3 % (ref 36–46)
HGB BLD-MCNC: 11.7 G/DL (ref 12–16)
HOLD SPECIMEN: NORMAL
MAGNESIUM SERPL-MCNC: 1.74 MG/DL (ref 1.6–2.4)
MCH RBC QN AUTO: 34.4 PG (ref 26–34)
MCHC RBC AUTO-ENTMCNC: 34.1 G/DL (ref 32–36)
MCV RBC AUTO: 101 FL (ref 80–100)
NRBC BLD-RTO: 0 /100 WBCS (ref 0–0)
PLATELET # BLD AUTO: 142 X10*3/UL (ref 150–450)
POTASSIUM SERPL-SCNC: 3.9 MMOL/L (ref 3.5–5.3)
PROT SERPL-MCNC: 6.4 G/DL (ref 6.4–8.2)
RBC # BLD AUTO: 3.4 X10*6/UL (ref 4–5.2)
SODIUM SERPL-SCNC: 136 MMOL/L (ref 136–145)
WBC # BLD AUTO: 4.2 X10*3/UL (ref 4.4–11.3)

## 2025-01-10 PROCEDURE — 2500000004 HC RX 250 GENERAL PHARMACY W/ HCPCS (ALT 636 FOR OP/ED): Performed by: NURSE PRACTITIONER

## 2025-01-10 PROCEDURE — 36415 COLL VENOUS BLD VENIPUNCTURE: CPT | Performed by: NURSE PRACTITIONER

## 2025-01-10 PROCEDURE — 83735 ASSAY OF MAGNESIUM: CPT | Performed by: NURSE PRACTITIONER

## 2025-01-10 PROCEDURE — 90471 IMMUNIZATION ADMIN: CPT | Performed by: INTERNAL MEDICINE

## 2025-01-10 PROCEDURE — 2500000001 HC RX 250 WO HCPCS SELF ADMINISTERED DRUGS (ALT 637 FOR MEDICARE OP)

## 2025-01-10 PROCEDURE — 85027 COMPLETE CBC AUTOMATED: CPT | Performed by: NURSE PRACTITIONER

## 2025-01-10 PROCEDURE — 2500000001 HC RX 250 WO HCPCS SELF ADMINISTERED DRUGS (ALT 637 FOR MEDICARE OP): Performed by: NURSE PRACTITIONER

## 2025-01-10 PROCEDURE — 84075 ASSAY ALKALINE PHOSPHATASE: CPT | Performed by: NURSE PRACTITIONER

## 2025-01-10 PROCEDURE — 2500000004 HC RX 250 GENERAL PHARMACY W/ HCPCS (ALT 636 FOR OP/ED): Performed by: INTERNAL MEDICINE

## 2025-01-10 PROCEDURE — 90656 IIV3 VACC NO PRSV 0.5 ML IM: CPT | Performed by: INTERNAL MEDICINE

## 2025-01-10 RX ORDER — MULTIVIT-MIN/IRON FUM/FOLIC AC 7.5 MG-4
1 TABLET ORAL DAILY
Qty: 30 TABLET | Refills: 1 | Status: SHIPPED | OUTPATIENT
Start: 2025-01-11 | End: 2026-01-11

## 2025-01-10 RX ORDER — FAMOTIDINE 20 MG/1
20 TABLET, FILM COATED ORAL 2 TIMES DAILY
Qty: 60 TABLET | Refills: 1 | Status: SHIPPED | OUTPATIENT
Start: 2025-01-10 | End: 2026-01-10

## 2025-01-10 RX ORDER — FOLIC ACID 1 MG/1
1 TABLET ORAL DAILY
Qty: 30 TABLET | Refills: 1 | Status: SHIPPED | OUTPATIENT
Start: 2025-01-11 | End: 2026-01-11

## 2025-01-10 RX ADMIN — FOLIC ACID 1 MG: 1 TABLET ORAL at 08:46

## 2025-01-10 RX ADMIN — THIAMINE HYDROCHLORIDE 500 MG: 100 INJECTION, SOLUTION INTRAMUSCULAR; INTRAVENOUS at 01:12

## 2025-01-10 RX ADMIN — FAMOTIDINE 20 MG: 20 TABLET, FILM COATED ORAL at 08:46

## 2025-01-10 RX ADMIN — INFLUENZA VIRUS VACCINE 0.5 ML: 15; 15; 15 SUSPENSION INTRAMUSCULAR at 14:32

## 2025-01-10 RX ADMIN — Medication 1 TABLET: at 08:46

## 2025-01-10 RX ADMIN — THIAMINE HYDROCHLORIDE 500 MG: 100 INJECTION, SOLUTION INTRAMUSCULAR; INTRAVENOUS at 08:46

## 2025-01-10 ASSESSMENT — LIFESTYLE VARIABLES
ANXIETY: NO ANXIETY, AT EASE
ORIENTATION AND CLOUDING OF SENSORIUM: ORIENTED AND CAN DO SERIAL ADDITIONS
TOTAL SCORE: 0
ANXIETY: NO ANXIETY, AT EASE
TREMOR: NO TREMOR
PAROXYSMAL SWEATS: NO SWEAT VISIBLE
TOTAL SCORE: 0
TOTAL SCORE: 0
AUDITORY DISTURBANCES: NOT PRESENT
NAUSEA AND VOMITING: NO NAUSEA AND NO VOMITING
TOTAL SCORE: 0
NAUSEA AND VOMITING: NO NAUSEA AND NO VOMITING
ORIENTATION AND CLOUDING OF SENSORIUM: ORIENTED AND CAN DO SERIAL ADDITIONS
TREMOR: NO TREMOR
AGITATION: NORMAL ACTIVITY
VISUAL DISTURBANCES: NOT PRESENT
PAROXYSMAL SWEATS: NO SWEAT VISIBLE
PAROXYSMAL SWEATS: NO SWEAT VISIBLE
NAUSEA AND VOMITING: NO NAUSEA AND NO VOMITING
VISUAL DISTURBANCES: NOT PRESENT
HEADACHE, FULLNESS IN HEAD: NOT PRESENT
TREMOR: NO TREMOR
ANXIETY: NO ANXIETY, AT EASE
ORIENTATION AND CLOUDING OF SENSORIUM: ORIENTED AND CAN DO SERIAL ADDITIONS
AGITATION: NORMAL ACTIVITY
AUDITORY DISTURBANCES: NOT PRESENT
AUDITORY DISTURBANCES: NOT PRESENT
ANXIETY: NO ANXIETY, AT EASE
VISUAL DISTURBANCES: NOT PRESENT
AUDITORY DISTURBANCES: NOT PRESENT
HEADACHE, FULLNESS IN HEAD: NOT PRESENT
TREMOR: NO TREMOR
ORIENTATION AND CLOUDING OF SENSORIUM: ORIENTED AND CAN DO SERIAL ADDITIONS
VISUAL DISTURBANCES: NOT PRESENT
PAROXYSMAL SWEATS: NO SWEAT VISIBLE
AGITATION: NORMAL ACTIVITY
NAUSEA AND VOMITING: NO NAUSEA AND NO VOMITING
AGITATION: NORMAL ACTIVITY
HEADACHE, FULLNESS IN HEAD: NOT PRESENT
HEADACHE, FULLNESS IN HEAD: NOT PRESENT

## 2025-01-10 ASSESSMENT — COGNITIVE AND FUNCTIONAL STATUS - GENERAL
HELP NEEDED FOR BATHING: A LITTLE
TOILETING: A LITTLE
DRESSING REGULAR UPPER BODY CLOTHING: A LITTLE
PERSONAL GROOMING: A LITTLE
CLIMB 3 TO 5 STEPS WITH RAILING: A LOT
EATING MEALS: A LITTLE
MOBILITY SCORE: 18
MOVING TO AND FROM BED TO CHAIR: A LITTLE
TURNING FROM BACK TO SIDE WHILE IN FLAT BAD: A LITTLE
STANDING UP FROM CHAIR USING ARMS: A LITTLE
DAILY ACTIVITIY SCORE: 18
WALKING IN HOSPITAL ROOM: A LITTLE
DRESSING REGULAR LOWER BODY CLOTHING: A LITTLE

## 2025-01-10 ASSESSMENT — PAIN SCALES - GENERAL
PAINLEVEL_OUTOF10: 0 - NO PAIN

## 2025-01-10 ASSESSMENT — PAIN - FUNCTIONAL ASSESSMENT
PAIN_FUNCTIONAL_ASSESSMENT: 0-10

## 2025-01-10 NOTE — PROGRESS NOTES
Carmella Frank was assessed by a Substance Use Navigator Specialist (SUNS) at 12:23 PM     Contact Number obtained during encounter:     Medical History: History reviewed. No pertinent past medical history.     Psychiatric History: anxiety    Social History:   Social History     Socioeconomic History    Marital status:      Spouse name: Not on file    Number of children: Not on file    Years of education: Not on file    Highest education level: Not on file   Occupational History    Not on file   Tobacco Use    Smoking status: Never    Smokeless tobacco: Never   Vaping Use    Vaping status: Never Used   Substance and Sexual Activity    Alcohol use: Not on file    Drug use: Not on file    Sexual activity: Not on file   Other Topics Concern    Not on file   Social History Narrative    Not on file     Social Drivers of Health     Financial Resource Strain: Low Risk  (1/9/2025)    Overall Financial Resource Strain (CARDIA)     Difficulty of Paying Living Expenses: Not hard at all   Food Insecurity: No Food Insecurity (1/7/2025)    Hunger Vital Sign     Worried About Running Out of Food in the Last Year: Never true     Ran Out of Food in the Last Year: Never true   Transportation Needs: No Transportation Needs (1/9/2025)    PRAPARE - Transportation     Lack of Transportation (Medical): No     Lack of Transportation (Non-Medical): No   Physical Activity: Not on file   Stress: Not on file   Social Connections: Not on file   Intimate Partner Violence: Not At Risk (1/7/2025)    Humiliation, Afraid, Rape, and Kick questionnaire     Fear of Current or Ex-Partner: No     Emotionally Abused: No     Physically Abused: No     Sexually Abused: No   Housing Stability: Low Risk  (1/9/2025)    Housing Stability Vital Sign     Unable to Pay for Housing in the Last Year: No     Number of Times Moved in the Last Year: 0     Homeless in the Last Year: No        UDS / Tox panel results:     Substance(s) used: Alcohol. Amount  (shots/glasses/beers per day/week, etc.) : wine. Frequency: daily. Last used: 1/6/25. Hx of withdrawal sx: yes. Hx of seizures: na. Hx of delirium tremens: na.     Brief Summary of Assessment:   SUNS talked with pt. Pt shared they have a great support sytem. Pt said they are retired and are close with their family. Pt said their 89 year old mother stresses pt out. Pt said their mother still drives and is independent. Pt said her mom stresses her out and she drinks wine to help with the stress. Pt shared she is going stop drinking and thinks she will not struggle quitting. Pt is not interested in therapy or any peer groups. Pt said they would rather talk with their friends and family then a stranger (therapist). Pt said they may talk with their PC about a anxiety medication to take.     Diagnosis / Diagnostic Impression:   Alcohol abuse disorder     Summary / Plan:  SUNS gave pt [facility] card.    Patient interested in treatment: Yes          Transportation Provided: shantell

## 2025-01-10 NOTE — NURSING NOTE
Rapid Response RN note:     12 hours post ICU transfer follow up    Patient remains confused - PCT 1:1 at bedside for patient safety. Neurology is following patient. MRI brain done yesterday. Patient is on CIWA protocol, assessments are negative this evening. VSS at this time. No concerns at this time per bedside RN. Rapid response team will continue to follow.

## 2025-01-10 NOTE — CARE PLAN
The patient's goals for the shift include  to obtain adequate sleep.    The clinical goals for the shift include Patient will remain free of injuries/fall this shift.

## 2025-01-10 NOTE — PROGRESS NOTES
Patient will discharge home, she was declined at [facility], unable to accept patient. Inappropriate Level of Care, was their response. Patient would prefer to do outpatient rehab, she is familiar with a therapist that works at an agency. PCP will write for script on patient's visit. CT team will continue to follow.

## 2025-01-10 NOTE — NURSING NOTE
AVS printed and reviewed with patient. All belonging sent with patient. New medication discussed with patient. IV x 2 removed cath intact o redness noted . Flu shot given and information on shot sent home with pt.

## 2025-01-10 NOTE — CARE PLAN
The patient's goals for the shift include  discharge    The clinical goals for the shift include Patient will remain free of injuries/fall this shift.

## 2025-01-10 NOTE — DISCHARGE SUMMARY
Discharge Diagnosis:   Recurrent falls       Admission Date: 1/6/2025   Discharge Date: 01/10/25       Hospital Course:   Carmella Frank is a pleasant 64 y.o. female            Discharge Physical Exam:    HEENT:  Atraumatic, PERRL. Conjunctivae clear.  Moist nasal mucous membranes.   Neck:  Supple without thyromegaly or lymphadenopathy.  Lungs:  Clear to auscultation without rales, rhonchi, or rub.  Heart:  RRR, S1, S2, without M.  Abdomen:  Soft, non tender, no organ enlargement.  Bowel sounds present . No CVA tenderness.  Extremities:  No edema. No calf swelling or tenderness.    Skin:  No rash, ecchymosis or erythema.          On the day of discharge patient was ambulating well, eating and drinking well. Physical exam was essentially benign and was at baseline. Blood chemistry and other lab testing results were at acceptable for discharge. Patient remained hemodynamically stable and with no further acute concern. Patient verbalized understanding of discharge medications and the expected adverse effects, if any.       The detail of the hospital course  including admission H&P, consult recommendations, biochemical lab results, imaging studies can be found in EHR of [facility]. Total 29 minutes time was spent on discharge exam, medicine reconciliation, discharge instructions and preparing discharge summary.     Home Medications After Discharge    Scheduled   famotidine (Pepcid) 20 mg tablet, Take 1 tablet (20 mg) by mouth 2 times a day.   folic acid (Folvite) 1 mg tablet, Take 1 tablet (1 mg) by mouth once daily., Start: 01/11/25   multivitamin with minerals tablet, Take 1 tablet by mouth once daily., Start: 01/11/25         Outpatient Follow up:   No future appointments.  PCP: King Keane MD   The transitional care management team of [facility] will contact patient.    Patient was also advised to call PCP's office for hospital discharge follow-up in 7-10 days from today.          PS:  This note was completed using Dragon voice recognition technology and may include unintended errors with respect to translation of words, typographical or grammar errors which may not have been identified while finalizing the chart.

## 2025-01-13 ENCOUNTER — PATIENT OUTREACH (OUTPATIENT)
Dept: PRIMARY CARE | Facility: CLINIC | Age: 65
End: 2025-01-13
Payer: COMMERCIAL

## 2025-01-13 NOTE — PROGRESS NOTES
Discharge Facility:  [facility]  Discharge Diagnosis: Recurrent Falls  Admission Date:  1/7/25  Discharge Date:  1/10/25    PCP Appointment Date: Message sent to Dr Keane's office  Specialist Appointment Date:   Hospital Encounter and Summary Linked: Yes  See discharge assessment below for further details     Engagement  Call Start Time: 1102 (1/13/2025 11:08 AM)    Medications  Medications reviewed with patient/caregiver?: Yes (1/13/2025 11:08 AM)  Is the patient having any side effects they believe may be caused by any medication additions or changes?: No (1/13/2025 11:08 AM)  Prescription Comments: START taking:  famotidine (Pepcid)   folic acid (Folvite)   Start taking on: January 11, 2025  multivitamin with minerals  Start taking on: January 11, 2025 (1/13/2025 11:08 AM)    Appointments  Does the patient have a primary care provider?: Yes (1/13/2025 11:08 AM)  Care Management Interventions: Advised patient to make appointment (1/13/2025 11:08 AM)  Care Management Interventions: Advised patient to keep appointment (1/13/2025 11:08 AM)  Follow Up Tasks: Appointments (1/13/2025 11:08 AM)    Self Management  What is the home health agency?: n/a (1/13/2025 11:08 AM)    Patient Teaching  Does the patient have access to their discharge instructions?: Yes (1/13/2025 11:08 AM)  Care Management Interventions: Reviewed instructions with patient (1/13/2025 11:08 AM)  What is the patient's perception of their health status since discharge?: Improving (1/13/2025 11:08 AM)    Wrap Up  Wrap Up Additional Comments: Patient denies any headache or vision distrubances. REviewed new medications and patient has an understanding of indication. This CM will send a message to Dr Keane's office for follow up. This CM gives contact information for non urgent matters (1/13/2025 11:08 AM)

## 2025-01-15 LAB
ATRIAL RATE: 87 BPM
P AXIS: 50 DEGREES
P OFFSET: 211 MS
P ONSET: 164 MS
PR INTERVAL: 120 MS
Q ONSET: 224 MS
QRS COUNT: 15 BEATS
QRS DURATION: 70 MS
QT INTERVAL: 380 MS
QTC CALCULATION(BAZETT): 457 MS
QTC FREDERICIA: 430 MS
R AXIS: 39 DEGREES
T AXIS: -3 DEGREES
T OFFSET: 414 MS
VENTRICULAR RATE: 87 BPM

## 2025-01-23 ENCOUNTER — APPOINTMENT (OUTPATIENT)
Dept: PRIMARY CARE | Facility: CLINIC | Age: 65
End: 2025-01-23
Payer: COMMERCIAL

## 2025-01-23 VITALS
SYSTOLIC BLOOD PRESSURE: 120 MMHG | HEIGHT: 65 IN | BODY MASS INDEX: 22.82 KG/M2 | WEIGHT: 137 LBS | HEART RATE: 66 BPM | TEMPERATURE: 97.9 F | DIASTOLIC BLOOD PRESSURE: 82 MMHG | OXYGEN SATURATION: 98 %

## 2025-01-23 DIAGNOSIS — R41.82 ALTERED MENTAL STATUS, UNSPECIFIED ALTERED MENTAL STATUS TYPE: ICD-10-CM

## 2025-01-23 DIAGNOSIS — R22.31 LOCALIZED SWELLING OF RIGHT UPPER EXTREMITY: ICD-10-CM

## 2025-01-23 DIAGNOSIS — Z09 HOSPITAL DISCHARGE FOLLOW-UP: Primary | ICD-10-CM

## 2025-01-23 DIAGNOSIS — M79.601 RIGHT ARM PAIN: ICD-10-CM

## 2025-01-23 RX ORDER — MAGNESIUM 200 MG
1 TABLET ORAL
COMMUNITY

## 2025-01-23 RX ORDER — CYANOCOBALAMIN (VITAMIN B-12) 250 MCG
250 TABLET ORAL DAILY
COMMUNITY

## 2025-01-23 ASSESSMENT — ENCOUNTER SYMPTOMS
LOSS OF SENSATION IN FEET: 0
OCCASIONAL FEELINGS OF UNSTEADINESS: 0
DEPRESSION: 0

## 2025-01-23 ASSESSMENT — PATIENT HEALTH QUESTIONNAIRE - PHQ9
2. FEELING DOWN, DEPRESSED OR HOPELESS: NOT AT ALL
SUM OF ALL RESPONSES TO PHQ9 QUESTIONS 1 AND 2: 0
1. LITTLE INTEREST OR PLEASURE IN DOING THINGS: NOT AT ALL

## 2025-01-23 NOTE — PROGRESS NOTES
"Patient: Carmella Frank  : 1960  PCP: King Keane MD  MRN: [id number]  Program: Transitional Care Management  Status: Enrolled  Effective Dates: 2025 - present  Responsible Staff: Vero Baxter RN  Social Drivers to be Addressed: No information to display         Carmella Frank is a 64 y.o. female presenting today for follow-up after being discharged from the hospital {Numbers; 1-14:23852} days ago. The main problem requiring admission was ***. The discharge summary and/or Transitional Care Management documentation was reviewed. Medication reconciliation was performed as indicated via the \"Nav as Reviewed\" timestamp.     Carmella Frank was contacted by Transitional Care Management services two days after her discharge. This encounter and supporting documentation was reviewed.    Review of Systems    /82 (BP Location: Left arm, Patient Position: Sitting, BP Cuff Size: Adult)   Pulse 66   Temp 36.6 °C (97.9 °F) (Temporal)   Ht 1.651 m (5' 5\")   Wt 62.1 kg (137 lb)   SpO2 98% Comment: ra  BMI 22.80 kg/m²     Physical Exam    The complexity of medical decision making for this patient's transitional care is {DESC; MODERATE/HIGH:70511}.    Assessment/Plan   {Assess/PlanSmartLinks:86579}    Patient was identified as a fall risk. Risk prevention instructions provided.  "   General: She is not in acute distress.     Appearance: Normal appearance.   HENT:      Head: Normocephalic.      Right Ear: Tympanic membrane normal.      Left Ear: Tympanic membrane normal.      Mouth/Throat:      Mouth: Mucous membranes are moist.   Cardiovascular:      Rate and Rhythm: Normal rate and regular rhythm.      Heart sounds: No murmur heard.  Pulmonary:      Effort: No respiratory distress.   Abdominal:      Palpations: Abdomen is soft.   Musculoskeletal:      Cervical back: Neck supple.      Right lower leg: No edema.      Left lower leg: No edema.   Skin:     Findings: No rash.   Neurological:      General: No focal deficit present.      Mental Status: She is alert and oriented to person, place, and time.   Psychiatric:         Mood and Affect: Mood normal.     The complexity of medical decision making for this patient's transitional care is moderate.    Assessment/Plan     1. Hospital discharge follow-up  Post discharge F/U; all concerns and questions were answered. Patient is now back to normal self and at baseline.      2. Right arm pain  Vascular US upper extremity arterial duplex right       3. Localized swelling of right upper extremity  Possible venous infiltration versus clot secondary to IV insertion in the hospital      4. Altered mental status, unspecified altered mental status type  All resolved.  Patient has not been taking any more alcohol.  These were all from alcoholic encephalopathy.        MEDICAL DECISION MAKING:  - The current and active medical co morbidities have been considered.   - Recent lab work and relevant imaging studies have been reviewed.    - Relevant correspondence/notes from other specialty consultants were reviewed.    - Patient was given treatment as per above plan.   - Next Follow up in 4 weeks    Patient was identified as a fall risk. Risk prevention instructions provided.

## 2025-01-24 ENCOUNTER — PATIENT OUTREACH (OUTPATIENT)
Dept: PRIMARY CARE | Facility: CLINIC | Age: 65
End: 2025-01-24
Payer: COMMERCIAL

## 2025-01-24 NOTE — PROGRESS NOTES
Call regarding appt. with PCP on 1/23/25 after hospitalization.  At time of outreach call the patient feels as if their condition has improved since last visit.  Reviewed the PCP appointment with the pt and addressed any questions or concerns. Patient states she is feeling so good

## 2025-01-27 DIAGNOSIS — M79.601 RIGHT ARM PAIN: Primary | ICD-10-CM

## 2025-01-27 DIAGNOSIS — M79.89 SWELLING OF RIGHT UPPER EXTREMITY: ICD-10-CM

## 2025-01-28 ENCOUNTER — TELEPHONE (OUTPATIENT)
Dept: PRIMARY CARE | Facility: CLINIC | Age: 65
End: 2025-01-28
Payer: COMMERCIAL

## 2025-01-28 ENCOUNTER — APPOINTMENT (OUTPATIENT)
Dept: CARDIOLOGY | Facility: HOSPITAL | Age: 65
End: 2025-01-28
Payer: COMMERCIAL

## 2025-01-28 ENCOUNTER — HOSPITAL ENCOUNTER (OUTPATIENT)
Dept: CARDIOLOGY | Facility: HOSPITAL | Age: 65
Discharge: HOME | End: 2025-01-28
Payer: COMMERCIAL

## 2025-01-28 DIAGNOSIS — M79.89 SWELLING OF RIGHT UPPER EXTREMITY: ICD-10-CM

## 2025-01-28 DIAGNOSIS — M79.601 RIGHT ARM PAIN: ICD-10-CM

## 2025-01-28 PROCEDURE — 93971 EXTREMITY STUDY: CPT | Performed by: INTERNAL MEDICINE

## 2025-01-28 PROCEDURE — 93971 EXTREMITY STUDY: CPT

## 2025-01-28 NOTE — TELEPHONE ENCOUNTER
Per Dr. Keane, he received a Doc halo from patients venus duplex results. Per Dr. Keane it is positive for a DVT. Dr. Keane requested a call to placed to patient and requesting to be seen ASAP to go over results. Call placed patient, patient was advised of results and was transferred to make an appointment.

## 2025-01-29 ENCOUNTER — OFFICE VISIT (OUTPATIENT)
Dept: PRIMARY CARE | Facility: CLINIC | Age: 65
End: 2025-01-29
Payer: COMMERCIAL

## 2025-01-29 VITALS
OXYGEN SATURATION: 100 % | SYSTOLIC BLOOD PRESSURE: 122 MMHG | HEART RATE: 62 BPM | BODY MASS INDEX: 22.33 KG/M2 | HEIGHT: 65 IN | WEIGHT: 134 LBS | DIASTOLIC BLOOD PRESSURE: 82 MMHG | TEMPERATURE: 97.2 F

## 2025-01-29 DIAGNOSIS — I82.621 ACUTE DEEP VEIN THROMBOSIS (DVT) OF OTHER VEIN OF RIGHT UPPER EXTREMITY: Primary | ICD-10-CM

## 2025-01-29 PROCEDURE — 3079F DIAST BP 80-89 MM HG: CPT | Performed by: INTERNAL MEDICINE

## 2025-01-29 PROCEDURE — 99214 OFFICE O/P EST MOD 30 MIN: CPT | Performed by: INTERNAL MEDICINE

## 2025-01-29 PROCEDURE — 3074F SYST BP LT 130 MM HG: CPT | Performed by: INTERNAL MEDICINE

## 2025-01-29 PROCEDURE — 3008F BODY MASS INDEX DOCD: CPT | Performed by: INTERNAL MEDICINE

## 2025-01-29 RX ORDER — APIXABAN 5 MG (74)
KIT ORAL
Qty: 74 TABLET | Refills: 0 | Status: SHIPPED | OUTPATIENT
Start: 2025-01-29

## 2025-01-30 ENCOUNTER — TELEPHONE (OUTPATIENT)
Dept: CASE MANAGEMENT | Facility: HOSPITAL | Age: 65
End: 2025-01-30
Payer: COMMERCIAL

## 2025-01-30 ENCOUNTER — APPOINTMENT (OUTPATIENT)
Dept: CARDIOLOGY | Facility: HOSPITAL | Age: 65
End: 2025-01-30
Payer: COMMERCIAL

## 2025-01-30 NOTE — TELEPHONE ENCOUNTER
This is an audio only call. This audio call was conducted in the [facility].     Substance Use Navigator Specialist (MARIANN) performed an audio only follow-up call with Carmella Frank at 1:47 PM     Was SUNS able to reach patient? Yes    If No, did SUNS leave a voicemail message?     Brief summary of call:  Suns outreached pt. Pt answered and said they are doing well. Pt shared they have bene going to their dr appointments.     Follow-up / Next steps:   NA  End of call time:   1:48 pm  Duration of audio encounter: SUNS Audio call - Minutes: 5-10 minutes

## 2025-02-06 ENCOUNTER — OFFICE VISIT (OUTPATIENT)
Dept: URGENT CARE | Age: 65
End: 2025-02-06
Payer: COMMERCIAL

## 2025-02-06 VITALS
WEIGHT: 134 LBS | DIASTOLIC BLOOD PRESSURE: 85 MMHG | HEART RATE: 74 BPM | HEIGHT: 65 IN | RESPIRATION RATE: 18 BRPM | SYSTOLIC BLOOD PRESSURE: 127 MMHG | BODY MASS INDEX: 22.33 KG/M2 | TEMPERATURE: 97.6 F | OXYGEN SATURATION: 99 %

## 2025-02-06 DIAGNOSIS — J06.9 UPPER RESPIRATORY TRACT INFECTION, UNSPECIFIED TYPE: Primary | ICD-10-CM

## 2025-02-06 PROBLEM — S60.229A CONTUSION OF HAND: Status: ACTIVE | Noted: 2025-02-06

## 2025-02-06 PROBLEM — M77.8 TENDINITIS OF WRIST: Status: ACTIVE | Noted: 2025-02-06

## 2025-02-06 PROBLEM — M79.646 PAIN OF FINGER: Status: ACTIVE | Noted: 2025-02-06

## 2025-02-06 PROBLEM — F90.9 HYPERKINETIC BEHAVIOR: Status: ACTIVE | Noted: 2025-02-06

## 2025-02-06 LAB
POC RAPID INFLUENZA A: NEGATIVE
POC RAPID INFLUENZA B: NEGATIVE
POC SARS-COV-2 AG BINAX: NORMAL

## 2025-02-06 RX ORDER — PREDNISONE 20 MG/1
20 TABLET ORAL DAILY
Qty: 5 TABLET | Refills: 0 | Status: SHIPPED | OUTPATIENT
Start: 2025-02-06 | End: 2025-02-11

## 2025-02-06 ASSESSMENT — ENCOUNTER SYMPTOMS
SINUS PRESSURE: 0
ABDOMINAL PAIN: 0
FEVER: 0
GASTROINTESTINAL NEGATIVE: 1
VOMITING: 0
NAUSEA: 0
COUGH: 0
CARDIOVASCULAR NEGATIVE: 1
CHILLS: 0
SINUS COMPLAINT: 1
HEADACHES: 0
SORE THROAT: 0
DEPRESSION: 0
RHINORRHEA: 1
DIARRHEA: 0

## 2025-02-06 ASSESSMENT — PATIENT HEALTH QUESTIONNAIRE - PHQ9
1. LITTLE INTEREST OR PLEASURE IN DOING THINGS: NOT AT ALL
2. FEELING DOWN, DEPRESSED OR HOPELESS: NOT AT ALL
SUM OF ALL RESPONSES TO PHQ9 QUESTIONS 1 & 2: 0

## 2025-02-06 NOTE — PROGRESS NOTES
"Subjective   Patient ID: Carmella Frank is a 64 y.o. female. They present today with a chief complaint of Sinus Problem (Pressure/pain /Started yesterday ).    History of Present Illness  Subjective  History was provided by the patient.  Carmella Frank is a 64 y.o. female who presents for evaluation of possible sinusitis. Symptoms include sinus and nasal congestion and sneezing. Onset of symptoms was 1 day ago, unchanged since that time. Associated symptoms include none. She is drinking moderate amounts of fluids. Evaluation to date: none. Treatment to date: none. Pt endorses a hx of sinus infections, and is requesting antibiotics.     Objective  /85 (BP Location: Left arm, Patient Position: Sitting)   Pulse 74   Temp 36.4 °C (97.6 °F)   Resp 18   Ht 1.651 m (5' 5\")   Wt 60.8 kg (134 lb)   SpO2 99%   BMI 22.30 kg/m²   [unfilled]     Assessment/Plan  viral upper respiratory illness    Discussed diagnosis and treatment of URI.  Discussed the importance of avoiding unnecessary antibiotic therapy.  Suggested symptomatic OTC remedies.  Suggested brief course of Afrin or similar.  Nasal saline spray for congestion.  Follow up as needed.        History provided by:  Patient and medical records  Sinus Problem  Associated symptoms: rhinorrhea    Associated symptoms: no abdominal pain, no congestion, no cough, no diarrhea, no ear pain, no fever, no headaches, no nausea, no sore throat and no vomiting        Past Medical History  Allergies as of 02/06/2025    (No Known Allergies)       (Not in a hospital admission)       History reviewed. No pertinent past medical history.    History reviewed. No pertinent surgical history.     reports that she has never smoked. She has never used smokeless tobacco. She reports that she does not currently use alcohol. She reports that she does not currently use drugs.    Review of Systems  Review of Systems   Constitutional:  Negative for chills and fever.   HENT:  Positive for " "rhinorrhea and sneezing. Negative for congestion, ear pain, sinus pressure and sore throat.    Respiratory:  Negative for cough.    Cardiovascular: Negative.    Gastrointestinal: Negative.  Negative for abdominal pain, diarrhea, nausea and vomiting.   Genitourinary: Negative.    Neurological:  Negative for headaches.   All other systems reviewed and are negative.                                 Objective    Vitals:    02/06/25 1145   BP: 127/85   BP Location: Left arm   Patient Position: Sitting   Pulse: 74   Resp: 18   Temp: 36.4 °C (97.6 °F)   SpO2: 99%   Weight: 60.8 kg (134 lb)   Height: 1.651 m (5' 5\")     No LMP recorded. Patient is postmenopausal.    Physical Exam  Vitals and nursing note reviewed.   Constitutional:       General: She is not in acute distress.     Appearance: Normal appearance. She is ill-appearing.   HENT:      Head: Atraumatic.      Right Ear: No middle ear effusion.      Left Ear:  No middle ear effusion.      Nose: Rhinorrhea present. No mucosal edema or congestion. Rhinorrhea is clear.      Right Turbinates: Not swollen.      Left Turbinates: Not swollen.      Mouth/Throat:      Lips: Pink.      Mouth: Mucous membranes are moist.      Pharynx: Oropharynx is clear. Uvula midline. No posterior oropharyngeal erythema or postnasal drip.   Cardiovascular:      Rate and Rhythm: Normal rate and regular rhythm.      Pulses: Normal pulses.      Heart sounds: Normal heart sounds.   Pulmonary:      Effort: Pulmonary effort is normal.      Breath sounds: Normal breath sounds and air entry. No decreased breath sounds or wheezing.   Musculoskeletal:      Cervical back: Normal range of motion and neck supple.   Skin:     General: Skin is warm and dry.      Capillary Refill: Capillary refill takes less than 2 seconds.   Neurological:      Mental Status: She is alert and oriented to person, place, and time.   Psychiatric:         Behavior: Behavior normal.         Procedures    Point of Care Test & " Imaging Results from this visit  Results for orders placed or performed in visit on 02/06/25   POCT Covid-19 Rapid Antigen   Result Value Ref Range    POC PARTH-COV-2 AG  Presumptive negative test for SARS-CoV-2 (no antigen detected)     Presumptive negative test for SARS-CoV-2 (no antigen detected)   POCT Influenza A/B manually resulted   Result Value Ref Range    POC Rapid Influenza A Negative Negative    POC Rapid Influenza B Negative Negative      No results found.    Diagnostic study results (if any) were reviewed by PATSY Macario.    Assessment/Plan   Allergies, medications, history, and pertinent labs/EKGs/Imaging reviewed by PATSY Macario.     Medical Decision Making  Risks, benefits, and alternatives of the medications and treatment plan prescribed today were discussed, and patient expressed understanding. Plan follow up as discussed or as needed if any worsening symptoms or change in condition. Reinforced red flags including (but not limited to): severe or worsening pain; difficulty swallowing; stiff neck; shortness of breath; coughing or vomiting blood; chest pain; and new or increased fever are indications to go to the Emergency Department.  At time of discharge patient was clinically well-appearing and HDS for outpatient management. The patient and/or family was educated regarding diagnosis, supportive care, OTC and Rx medications. The patient and/or family was given the opportunity to ask questions prior to discharge.  They verbalized understanding of my discussion of the plans for treatment, expected course, indications to return to  or seek further evaluation in ED, and the need for timely follow up as directed.   They were provided with a work/school excuse if requested. The after-visit summary was given to the patient and care instructions were reviewed with the patient. All questions were answered and the patient verbalized understanding of the plan of care for  today.  Plan:  Recent visit notes reviewed  Meds as above  Increase clear fluids  Pcp follow up this week if not improving or worsening  ER visit anytime 24/7 for acute worsening or changing condition      Orders and Diagnoses  Diagnoses and all orders for this visit:  Upper respiratory tract infection, unspecified type  -     POCT Covid-19 Rapid Antigen  -     POCT Influenza A/B manually resulted  -     predniSONE (Deltasone) 20 mg tablet; Take 1 tablet (20 mg) by mouth once daily for 5 days.      Medical Admin Record      Patient disposition: Home    Electronically signed by PATSY Macario  12:36 PM

## 2025-02-07 ENCOUNTER — APPOINTMENT (OUTPATIENT)
Dept: PRIMARY CARE | Facility: CLINIC | Age: 65
End: 2025-02-07
Payer: COMMERCIAL

## 2025-02-17 ENCOUNTER — APPOINTMENT (OUTPATIENT)
Dept: PRIMARY CARE | Facility: CLINIC | Age: 65
End: 2025-02-17
Payer: COMMERCIAL

## 2025-02-25 ENCOUNTER — PATIENT OUTREACH (OUTPATIENT)
Dept: PRIMARY CARE | Facility: CLINIC | Age: 65
End: 2025-02-25
Payer: COMMERCIAL

## 2025-02-25 NOTE — PROGRESS NOTES
Successful outreach to patient regarding hospitalization as patient continues TCM program.   At time of outreach call the patient feels as if their condition has improved since initial visit with PCP or specialist.  Questions or concerns addressed at this time with patient.   Provided contact information to patient if any further non-emergent needs arise.  Patient states she still has some concerns over her right upper arm not healing. This CM did encouraged her to call the office of Dr Keane for an appt

## 2025-02-26 ENCOUNTER — TELEPHONE (OUTPATIENT)
Dept: PRIMARY CARE | Facility: CLINIC | Age: 65
End: 2025-02-26
Payer: COMMERCIAL

## 2025-02-26 NOTE — TELEPHONE ENCOUNTER
Patient called office advising that she completed the ultrasound of her arm and was diagnosed with a DVT of her RUE. Patient was started on a blood thinner. Patient states that her arm is still the same. She is asking if there is something else she should be doing.    Patient had an OV on 01/29/2025 but the note is not dictated.

## 2025-03-05 ENCOUNTER — TELEPHONE (OUTPATIENT)
Dept: PRIMARY CARE | Facility: CLINIC | Age: 65
End: 2025-03-05
Payer: COMMERCIAL

## 2025-03-05 DIAGNOSIS — I82.A12 ACUTE DEEP VEIN THROMBOSIS (DVT) OF AXILLARY VEIN OF LEFT UPPER EXTREMITY (MULTI): Primary | ICD-10-CM

## 2025-03-05 NOTE — TELEPHONE ENCOUNTER
Patient calling to state that she is out of the eliquis.  Patient is asking if she should continue on the medication due to a raised vein.    Please advise     Pharmacy is updated on chart .

## 2025-03-06 NOTE — TELEPHONE ENCOUNTER
Patient called the office today stating that the Eliquis is going to cost her $547 out of pocket, she is requesting an alternative please advise

## 2025-03-12 ENCOUNTER — APPOINTMENT (OUTPATIENT)
Dept: PRIMARY CARE | Facility: CLINIC | Age: 65
End: 2025-03-12
Payer: COMMERCIAL

## 2025-03-12 VITALS
TEMPERATURE: 97.9 F | SYSTOLIC BLOOD PRESSURE: 130 MMHG | OXYGEN SATURATION: 99 % | DIASTOLIC BLOOD PRESSURE: 82 MMHG | HEART RATE: 88 BPM | HEIGHT: 65 IN | BODY MASS INDEX: 23.22 KG/M2 | WEIGHT: 139.4 LBS

## 2025-03-12 DIAGNOSIS — I82.A12 ACUTE DEEP VEIN THROMBOSIS (DVT) OF AXILLARY VEIN OF LEFT UPPER EXTREMITY (MULTI): Primary | ICD-10-CM

## 2025-03-12 PROCEDURE — 1036F TOBACCO NON-USER: CPT | Performed by: INTERNAL MEDICINE

## 2025-03-12 PROCEDURE — 3079F DIAST BP 80-89 MM HG: CPT | Performed by: INTERNAL MEDICINE

## 2025-03-12 PROCEDURE — 3008F BODY MASS INDEX DOCD: CPT | Performed by: INTERNAL MEDICINE

## 2025-03-12 PROCEDURE — 99214 OFFICE O/P EST MOD 30 MIN: CPT | Performed by: INTERNAL MEDICINE

## 2025-03-12 PROCEDURE — 3075F SYST BP GE 130 - 139MM HG: CPT | Performed by: INTERNAL MEDICINE

## 2025-03-13 NOTE — PROGRESS NOTES
"Carmella Frank, pleasant 64 y.o. female was seen today for:      Chief Complaint   Patient presents with    Follow-up     Concern for raised vein potential blood clot. Patient advises that she has had one recently.        Carmella Frank   Comes here with her .  She has been treated with Eliquis for right upper extremity DVT.  She still has swelling in the vein.  She is worried that the medicine is not working.  Also the price of the Eliquis is very high.  If possible she wants to discontinue or get other medications.  We discussed about the option of Coumadin.  She is not very happy with the blood work and the risk of Coumadin.  Her DVT was positive on January 28, 2025.  I explained to her that at least for 3 months she has to be on Eliquis and then we can repeat the ultrasound to see if the clot is removed then we can stop Eliquis at that point.  Patient agrees with the plan accordingly will get another ultrasound of the upper extremity on or after April 28, 2025.  She denies any shortness of breath.  She does not have black tarry stool or any other abnormal bleeding      MEDICAL DECISION MAKING:  - Current co morbidities have been considered.   - All pertinent labs and images were addressed as per medical necessity.    - Also reviewed relevant notes from the specialty consultants.     - Time spent before, during and after office visit, which includes coordination of care counseling was 32 minutes  - Next follow up : May 2025    TODAY'S VISIT  DX:     1. Acute deep vein thrombosis (DVT) of axillary vein of left upper extremity (Multi)  Vascular US Upper Extremity Venous Duplex Right         \  Visit Vitals  /82 (BP Location: Left arm, Patient Position: Sitting, BP Cuff Size: Adult)   Pulse 88   Temp 36.6 °C (97.9 °F) (Temporal)   Ht 1.651 m (5' 5\")   Wt 63.2 kg (139 lb 6.4 oz)   SpO2 99%   BMI 23.20 kg/m²   OB Status Postmenopausal   Smoking Status Never   BSA 1.7 m²     Wt Readings from Last 10 " Encounters:   03/12/25 63.2 kg (139 lb 6.4 oz)   02/06/25 60.8 kg (134 lb)   01/29/25 60.8 kg (134 lb)   01/23/25 62.1 kg (137 lb)   01/10/25 67 kg (147 lb 11.3 oz)   10/06/20 61.2 kg (135 lb)       MEDICATIONS:   Current Outpatient Medications   Medication Instructions    apixaban (ELIQUIS) 5 mg, oral, 2 times daily    cyanocobalamin (VITAMIN B-12) 250 mcg, Daily    famotidine (PEPCID) 20 mg, oral, 2 times daily    folic acid (FOLVITE) 1 mg, oral, Daily    magnesium 200 mg tablet 1 tablet    multivitamin with minerals tablet 1 tablet, oral, Daily       Review of Systems   Constitutional:  Negative for activity change and fever.   HENT:  Negative for hearing loss, nosebleeds and tinnitus.    Eyes:  Negative for redness.   Respiratory:  Negative for chest tightness and stridor.    Cardiovascular:  Negative for chest pain, palpitations and leg swelling.   Gastrointestinal:  Negative for blood in stool.   Endocrine: Negative for cold intolerance.   Genitourinary:  Negative for hematuria.   Musculoskeletal:  Negative for joint swelling.   Skin:  Negative for rash.   Neurological:  Negative for speech difficulty and light-headedness.   Psychiatric/Behavioral:  Negative for behavioral problems.       Physical Exam  Constitutional:       General: She is not in acute distress.     Appearance: Normal appearance.   HENT:      Head: Normocephalic.      Right Ear: Tympanic membrane normal.      Left Ear: Tympanic membrane normal.      Mouth/Throat:      Mouth: Mucous membranes are moist.   Cardiovascular:      Rate and Rhythm: Normal rate and regular rhythm.      Heart sounds: No murmur heard.  Pulmonary:      Effort: No respiratory distress.   Abdominal:      Palpations: Abdomen is soft.   Musculoskeletal:      Cervical back: Neck supple.      Right lower leg: No edema.      Left lower leg: No edema.   Skin:     Findings: No rash.   Neurological:      General: No focal deficit present.      Mental Status: She is alert and  oriented to person, place, and time.   Psychiatric:         Mood and Affect: Mood normal.      RECENT LABS:  Lab Results   Component Value Date    WBC 4.2 (L) 01/10/2025    HGB 11.7 (L) 01/10/2025    HCT 34.3 (L) 01/10/2025     (L) 01/10/2025    ALT 51 (H) 01/10/2025    AST 55 (H) 01/10/2025     01/10/2025    K 3.9 01/10/2025     01/10/2025    CREATININE 0.63 01/10/2025    BUN 12 01/10/2025    CO2 23 01/10/2025    TSH 1.35 01/07/2025    INR 1.0 01/06/2025     Lab Results   Component Value Date    GLUCOSE 107 (H) 01/10/2025    CALCIUM 9.0 01/10/2025     01/10/2025    K 3.9 01/10/2025    CO2 23 01/10/2025     01/10/2025    BUN 12 01/10/2025    CREATININE 0.63 01/10/2025      Lab Results   Component Value Date    CREATININE 0.63 01/10/2025         P.S: This note was completed using Dragon voice recognition technology and may include unintended errors with respect to translation of words, typographical errors or grammar errors which may not have been identified while finalizing the chart.

## 2025-03-21 ASSESSMENT — ENCOUNTER SYMPTOMS
LIGHT-HEADEDNESS: 0
SPEECH DIFFICULTY: 0
CHEST TIGHTNESS: 0
STRIDOR: 0
JOINT SWELLING: 0
FEVER: 0
PALPITATIONS: 0
BLOOD IN STOOL: 0
EYE REDNESS: 0
HEMATURIA: 0
ACTIVITY CHANGE: 0

## 2025-03-25 ENCOUNTER — PATIENT OUTREACH (OUTPATIENT)
Dept: PRIMARY CARE | Facility: CLINIC | Age: 65
End: 2025-03-25
Payer: COMMERCIAL

## 2025-03-25 NOTE — PROGRESS NOTES
90 Day Call  This CM called and spoke with patient to address any final questions or concerns regarding hospitalization. Patient reports doing well and has no concerns. Patients thanks this CM for time.

## 2025-03-28 NOTE — PROGRESS NOTES
"Carmella Frank, pleasant 64 y.o. female was seen today for:      Chief Complaint   Patient presents with    Results     Patient in office today to follow up on recent testing results       Carmella Frank   Patient was recently in the hospital for altered mental status.  She needed IV fluids antibiotic.  She found to have swollen right upper extremity in the vein.  We did an ultrasound to rule out DVT.  She is found to be positive for upper extremity DVT.  She was started Eliquis.  Patient denies any shortness of breath.  She does not have a history of any GI bleed in the past.  Her H&H and platelet are stable.      MEDICAL DECISION MAKING:  - Current co morbidities have been considered.   - All pertinent labs and images were addressed as per medical necessity.    - Also reviewed relevant notes from the specialty consultants.     - Time spent before, during and after office visit, which includes coordination of care counseling was 32 minutes  - Next follow up : 3 months    TODAY'S VISIT  DX:     1. Acute deep vein thrombosis (DVT) of other vein of right upper extremity   apixaban (Eliquis DVT-PE Treat 30D Start) 5 mg (74 tabs) tablet         Visit Vitals  /82 (BP Location: Left arm, Patient Position: Sitting, BP Cuff Size: Adult)   Pulse 62   Temp 36.2 °C (97.2 °F) (Temporal)   Ht 1.651 m (5' 5\")   Wt 60.8 kg (134 lb)   SpO2 100%   BMI 22.30 kg/m²   OB Status Postmenopausal   Smoking Status Never   BSA 1.67 m²     Physical Exam  Constitutional:       General: She is not in acute distress.     Appearance: Normal appearance.   HENT:      Head: Normocephalic.      Right Ear: Tympanic membrane normal.      Left Ear: Tympanic membrane normal.      Mouth/Throat:      Mouth: Mucous membranes are moist.   Cardiovascular:      Rate and Rhythm: Normal rate and regular rhythm.      Heart sounds: No murmur heard.  Pulmonary:      Effort: No respiratory distress.   Abdominal:      Palpations: Abdomen is soft. "   Musculoskeletal:      Cervical back: Neck supple.      Right lower leg: No edema.      Left lower leg: No edema.   Skin:     Findings: No rash.   Neurological:      General: No focal deficit present.      Mental Status: She is alert and oriented to person, place, and time.   Psychiatric:         Mood and Affect: Mood normal.         Wt Readings from Last 10 Encounters:   03/12/25 63.2 kg (139 lb 6.4 oz)   02/06/25 60.8 kg (134 lb)   01/29/25 60.8 kg (134 lb)   01/23/25 62.1 kg (137 lb)   01/10/25 67 kg (147 lb 11.3 oz)   10/06/20 61.2 kg (135 lb)         MEDICATIONS:   Current Outpatient Medications   Medication Instructions    apixaban (ELIQUIS) 5 mg, oral, 2 times daily    cyanocobalamin (VITAMIN B-12) 250 mcg, Daily    famotidine (PEPCID) 20 mg, oral, 2 times daily    folic acid (FOLVITE) 1 mg, oral, Daily    magnesium 200 mg tablet 1 tablet    multivitamin with minerals tablet 1 tablet, oral, Daily       Review of Systems   Constitutional:  No activity change or fever   HENT:  Denies ringing ears or nose bleed   Respiratory:  Denies stridor. No blood in sputum   Cardiovascular:  Denies chest pain, no sudden excessive sweating   Gastrointestinal:  No sour burping, no blood in stool    Genitourinary:  Denies blood in urine    Musculoskeletal:  No joint redness or swelling    Skin:  No new spot changing color or shape or border    Neurological:  No speech difficulty, facial droop    Psychiatric/Behavioral:  No agitation, denies Hallucination     RECENT LABS:  Lab Results   Component Value Date    WBC 4.2 (L) 01/10/2025    HGB 11.7 (L) 01/10/2025    HCT 34.3 (L) 01/10/2025     (L) 01/10/2025    ALT 51 (H) 01/10/2025    AST 55 (H) 01/10/2025     01/10/2025    K 3.9 01/10/2025     01/10/2025    CREATININE 0.63 01/10/2025    BUN 12 01/10/2025    CO2 23 01/10/2025    TSH 1.35 01/07/2025    INR 1.0 01/06/2025     Lab Results   Component Value Date    GLUCOSE 107 (H) 01/10/2025    CALCIUM 9.0  01/10/2025     01/10/2025    K 3.9 01/10/2025    CO2 23 01/10/2025     01/10/2025    BUN 12 01/10/2025    CREATININE 0.63 01/10/2025     Lab Results   Component Value Date    CREATININE 0.63 01/10/2025       P.S: This note was completed using Dragon voice recognition technology and may include unintended errors with respect to translation of words, typography or grammatical errors. They may not have been identified while finalizing the chart.

## 2025-04-29 DIAGNOSIS — Z12.31 ENCOUNTER FOR SCREENING MAMMOGRAM FOR BREAST CANCER: ICD-10-CM

## 2025-04-30 ENCOUNTER — HOSPITAL ENCOUNTER (OUTPATIENT)
Dept: RADIOLOGY | Facility: HOSPITAL | Age: 65
Discharge: HOME | End: 2025-04-30
Payer: MEDICARE

## 2025-04-30 DIAGNOSIS — I82.A12 ACUTE DEEP VEIN THROMBOSIS (DVT) OF AXILLARY VEIN OF LEFT UPPER EXTREMITY: ICD-10-CM

## 2025-04-30 PROCEDURE — 93971 EXTREMITY STUDY: CPT

## 2025-05-02 ENCOUNTER — TELEPHONE (OUTPATIENT)
Dept: PRIMARY CARE | Facility: CLINIC | Age: 65
End: 2025-05-02
Payer: MEDICARE